# Patient Record
Sex: MALE | Race: BLACK OR AFRICAN AMERICAN | NOT HISPANIC OR LATINO | Employment: UNEMPLOYED | URBAN - METROPOLITAN AREA
[De-identification: names, ages, dates, MRNs, and addresses within clinical notes are randomized per-mention and may not be internally consistent; named-entity substitution may affect disease eponyms.]

---

## 2023-04-25 ENCOUNTER — HOSPITAL ENCOUNTER (INPATIENT)
Facility: HOSPITAL | Age: 48
LOS: 1 days | Discharge: HOME | End: 2023-04-27
Attending: EMERGENCY MEDICINE | Admitting: INTERNAL MEDICINE

## 2023-04-25 ENCOUNTER — APPOINTMENT (OUTPATIENT)
Dept: MRI IMAGING | Facility: HOSPITAL | Age: 48
End: 2023-04-25

## 2023-04-25 ENCOUNTER — APPOINTMENT (EMERGENCY)
Dept: CT IMAGING | Facility: HOSPITAL | Age: 48
End: 2023-04-25

## 2023-04-25 DIAGNOSIS — F41.9 ANXIETY: ICD-10-CM

## 2023-04-25 DIAGNOSIS — R20.0 RIGHT FACIAL NUMBNESS: ICD-10-CM

## 2023-04-25 DIAGNOSIS — R29.90 STROKE-LIKE SYMPTOMS: Primary | ICD-10-CM

## 2023-04-25 DIAGNOSIS — I10 HTN (HYPERTENSION) WITH GOAL TO BE DETERMINED: ICD-10-CM

## 2023-04-25 DIAGNOSIS — Z72.0 TOBACCO ABUSE: ICD-10-CM

## 2023-04-25 DIAGNOSIS — R93.0 ABNORMAL CT SCAN OF HEAD: ICD-10-CM

## 2023-04-25 DIAGNOSIS — R29.90 STROKE-LIKE SYMPTOMS: ICD-10-CM

## 2023-04-25 LAB
2HR DELTA HS TROPONIN: 10 NG/L
4HR DELTA HS TROPONIN: 5 NG/L
ANION GAP SERPL CALCULATED.3IONS-SCNC: 6 MMOL/L (ref 4–13)
APTT PPP: 27 SECONDS (ref 23–37)
BUN SERPL-MCNC: 15 MG/DL (ref 5–25)
CALCIUM SERPL-MCNC: 9.8 MG/DL (ref 8.4–10.2)
CARDIAC TROPONIN I PNL SERPL HS: 76 NG/L
CARDIAC TROPONIN I PNL SERPL HS: 81 NG/L
CARDIAC TROPONIN I PNL SERPL HS: 86 NG/L
CHLORIDE SERPL-SCNC: 101 MMOL/L (ref 96–108)
CO2 SERPL-SCNC: 26 MMOL/L (ref 21–32)
CREAT SERPL-MCNC: 1.01 MG/DL (ref 0.6–1.3)
ERYTHROCYTE [DISTWIDTH] IN BLOOD BY AUTOMATED COUNT: 13.1 % (ref 11.6–15.1)
FLUAV RNA RESP QL NAA+PROBE: NEGATIVE
FLUBV RNA RESP QL NAA+PROBE: NEGATIVE
GFR SERPL CREATININE-BSD FRML MDRD: 87 ML/MIN/1.73SQ M
GLUCOSE SERPL-MCNC: 142 MG/DL (ref 65–140)
HCT VFR BLD AUTO: 49.6 % (ref 36.5–49.3)
HGB BLD-MCNC: 17.2 G/DL (ref 12–17)
INR PPP: 1.08 (ref 0.84–1.19)
MCH RBC QN AUTO: 30.7 PG (ref 26.8–34.3)
MCHC RBC AUTO-ENTMCNC: 34.7 G/DL (ref 31.4–37.4)
MCV RBC AUTO: 88 FL (ref 82–98)
PLATELET # BLD AUTO: 276 THOUSANDS/UL (ref 149–390)
PMV BLD AUTO: 9.6 FL (ref 8.9–12.7)
POTASSIUM SERPL-SCNC: 3.8 MMOL/L (ref 3.5–5.3)
PROTHROMBIN TIME: 13.8 SECONDS (ref 11.6–14.5)
RBC # BLD AUTO: 5.61 MILLION/UL (ref 3.88–5.62)
RSV RNA RESP QL NAA+PROBE: NEGATIVE
SARS-COV-2 RNA RESP QL NAA+PROBE: NEGATIVE
SODIUM SERPL-SCNC: 133 MMOL/L (ref 135–147)
WBC # BLD AUTO: 12.27 THOUSAND/UL (ref 4.31–10.16)

## 2023-04-25 RX ORDER — ENOXAPARIN SODIUM 100 MG/ML
40 INJECTION SUBCUTANEOUS DAILY
Status: DISCONTINUED | OUTPATIENT
Start: 2023-04-26 | End: 2023-04-26

## 2023-04-25 RX ORDER — ASPIRIN 325 MG
325 TABLET ORAL ONCE
Status: COMPLETED | OUTPATIENT
Start: 2023-04-25 | End: 2023-04-25

## 2023-04-25 RX ORDER — LABETALOL HYDROCHLORIDE 5 MG/ML
10 INJECTION, SOLUTION INTRAVENOUS ONCE
Status: COMPLETED | OUTPATIENT
Start: 2023-04-25 | End: 2023-04-25

## 2023-04-25 RX ORDER — HYDROXYZINE HYDROCHLORIDE 25 MG/1
25 TABLET, FILM COATED ORAL EVERY 6 HOURS PRN
Status: DISCONTINUED | OUTPATIENT
Start: 2023-04-25 | End: 2023-04-27 | Stop reason: HOSPADM

## 2023-04-25 RX ORDER — ATORVASTATIN CALCIUM 40 MG/1
40 TABLET, FILM COATED ORAL EVERY EVENING
Status: DISCONTINUED | OUTPATIENT
Start: 2023-04-25 | End: 2023-04-27 | Stop reason: HOSPADM

## 2023-04-25 RX ORDER — CLOPIDOGREL BISULFATE 75 MG/1
300 TABLET ORAL ONCE
Status: COMPLETED | OUTPATIENT
Start: 2023-04-25 | End: 2023-04-25

## 2023-04-25 RX ORDER — CLOPIDOGREL BISULFATE 75 MG/1
75 TABLET ORAL DAILY
Status: DISCONTINUED | OUTPATIENT
Start: 2023-04-26 | End: 2023-04-26

## 2023-04-25 RX ADMIN — IOHEXOL 100 ML: 350 INJECTION, SOLUTION INTRAVENOUS at 10:24

## 2023-04-25 RX ADMIN — CLOPIDOGREL BISULFATE 300 MG: 75 TABLET ORAL at 11:13

## 2023-04-25 RX ADMIN — GADOBUTROL 9 ML: 604.72 INJECTION INTRAVENOUS at 22:47

## 2023-04-25 RX ADMIN — ATORVASTATIN CALCIUM 40 MG: 40 TABLET, FILM COATED ORAL at 17:35

## 2023-04-25 RX ADMIN — LABETALOL HYDROCHLORIDE 10 MG: 5 INJECTION, SOLUTION INTRAVENOUS at 11:13

## 2023-04-25 RX ADMIN — ASPIRIN 325 MG ORAL TABLET 325 MG: 325 PILL ORAL at 11:13

## 2023-04-25 NOTE — CONSULTS
Consultation - Stroke   Brock Garcia 49 y o  male MRN: [id number]  Unit/Bed#: ED 17 Encounter: [id number]      Assessment/Plan   Stroke-like symptoms  Assessment & Plan  Shilpa Aponte is a 49 y o  male with tobacco abuse who presents to [facility] ED on 4/25/2023 as a stroke alert with right upper and lower lip numbness and right thumb numbness  - BP elevated on presentation: 217/108  - NIHSS of 1  - CT head:  · Unremarkable for acute hemorrhage  · Scattered age-indeterminate hypodensities in bilateral parietal lobes   - CTA head and neck:   · Unremarkable for large vessel occlusion or critical stenosis  Thrombolytic Decision: Patient not a candidate  Unclear time of onset outside appropriate time window  Given hypertension on presentation with other vascular risk factors including tobacco abuse, will admit on stroke pathway and obtain MRI brain  Plan:  - Recommend MRI brain w wo  - Recommend echo  - Recommend: Fasting lipid panel, hemoglobin A1c, TSH  - S/p  mg x 1 and Plavix 300 mg x 1  - Plan to start DAPT ASA 81 mg daily and Plavix 75 mg daily on 4/26/2023  - Okay to start atorvastatin 40 mg daily  - Allow for permissive hypertension for 24 hours post stroke symptom onset (up until approximately 8 PM on 4/25/2023)  Goal /110  · If MRI brain is negative, recommend normotension   - Goal euglycemia, normothermia  - Frequent neurochecks  - PT/OT/speech  - Stroke education  - Medical management and supportive care per primary team, notify with changes    Tobacco abuse  Assessment & Plan  - Reportedly smokes 1 pack/day for the last 4-5 months  - Smoking cessation education  - Management per primary team    Recommendations for outpatient neurological follow up have yet to be determined      History of Present Illness     Reason for Consult / Principal Problem: Stroke alert, right lip and thumb numbness  Hx and PE limited by: N/A  Patient last known well: 8 PM on 4/24/2023  Stroke alert called: 1012 on 4/25/2023  Neurology time of arrival: Attending neurologist responded immediately to the phone call  HPI: Khushi Clinton is a 49 y o   male with tobacco abuse who presents to [facility] ED on 4/25/2023 as a stroke alert with right upper and lower lip numbness and right thumb numbness  Patient reports he was taking a bath last night 4/24/2023 at approximately 8 PM when he developed a severe right hemispheric pain which she describes as sharp and throbbing, which lasted 2-3 minutes and resolved on its own  After approximately 15 minutes, patient noticed numbness in his right upper lip and shortly after noticed numbness in his right thumb  At this time patient did not have any of the headache  Patient states he went to bed and when he woke up this morning noticed that the numbness was now present in his right upper and lower lip, and continued in his right thumb  Patient states he does not have any significant medical issues  He does admit to smoking 1 pack/day for the last 4-5 months  He reports significant stressors in his life stating that his mother is in hospice, who resides with his brother who is paralyzed from the neck down, and also reports the recent death of his son who was 21years old  Patient states he does not have a history of high blood pressure, high cholesterol, or diabetes  Patient does not take any blood thinners at baseline and states he does not take any medications on a daily basis  Patient also reports he does not typically get headaches  Patient presented to [facility] ED on 4/25/2023 as a stroke alert, BP was elevated at 217/108  Upon arrival to the ED, patient states that the numbness in his right thumb had resolved  CT head unremarkable for acute hemorrhage, however did demonstrate age-indeterminate hypodensities in bilateral parietal lobes  CTA head and neck unremarkable for large vessel occlusion or critical stenosis   Patient was not an IV TNK candidate as he was outside of the appropriate time window  Patient was loaded with aspirin and Plavix and admitted on the stroke pathway  Inpatient consult to Neurology  Consult performed by: Gillian Holbrook PA-C  Consult ordered by: Kimo Perkins MD        Review of Systems  12 point ROS limited to acuity of condition  Historical Information   History reviewed  No pertinent past medical history  History reviewed  No pertinent surgical history  Social History   Social History     Substance and Sexual Activity   Alcohol Use Not Currently     Social History     Substance and Sexual Activity   Drug Use Never     E-Cigarette/Vaping     E-Cigarette/Vaping Substances     Social History     Tobacco Use   Smoking Status Every Day   • Packs/day: 1 00   • Types: Cigarettes   Smokeless Tobacco Never     Family History: History reviewed  No pertinent family history  Review of previous medical records was completed  Meds/Allergies   all current active meds have been reviewed, current meds:   No current facility-administered medications for this encounter    , PTA meds:   None    and     No Known Allergies    Objective   Vitals:Blood pressure (!) 220/110, pulse 81, temperature 97 8 °F (36 6 °C), temperature source Oral, resp  rate 22, weight 102 kg (225 lb 15 5 oz), SpO2 98 %  ,There is no height or weight on file to calculate BMI  No intake or output data in the 24 hours ending 04/25/23 1157    Invasive Devices: Invasive Devices     Peripheral Intravenous Line  Duration           Peripheral IV 04/25/23 Left Antecubital <1 day              Physical Exam  Vitals and nursing note reviewed  Constitutional:       General: He is not in acute distress  Appearance: Normal appearance  He is not ill-appearing, toxic-appearing or diaphoretic  HENT:      Head: Normocephalic and atraumatic  No Campbell's sign  Eyes:      General: No scleral icterus  Right eye: No discharge  Left eye: Discharge present       Extraocular Movements: Extraocular movements intact and EOM normal       Conjunctiva/sclera: Conjunctivae normal       Pupils: Pupils are equal, round, and reactive to light  Comments: Left eye conjunctiva erythematous   Musculoskeletal:         General: Normal range of motion  Cervical back: Normal range of motion and neck supple  Skin:     General: Skin is warm and dry  Findings: No bruising  Neurological:      Mental Status: He is alert  Psychiatric:         Mood and Affect: Mood normal          Behavior: Behavior normal          Thought Content: Thought content normal          Judgment: Judgment normal        Neurologic Exam     Mental Status   Patient is alert, sitting up in bed  Oriented x3  No dysarthria or aphasia noted  Able to follow central commands and answers all questions appropriately  Cranial Nerves     CN II   Visual fields full to confrontation  CN III, IV, VI   Pupils are equal, round, and reactive to light  Extraocular motions are normal    Nystagmus: none   Upgaze: normal  Downgaze: normal  Conjugate gaze: present    CN VII   Facial expression full, symmetric  CN VIII   Hearing: intact    CN XII   Tongue deviation: none  Diminished sensation to light touch in right upper and lower lip     Motor Exam   Muscle bulk: normal  Overall muscle tone: normal  Bilateral  5/5  Bilateral biceps, triceps, deltoid strength 5/5  Bilateral hip flexion strength 5/5  Bilateral dorsiflexion and plantarflexion strength 5/5     Sensory Exam   Light touch normal    No evidence of extinction with bilateral simultaneous stimulation     Gait, Coordination, and Reflexes     Tremor   Resting tremor: absent  No ataxia or dysmetria in bilateral upper extremity finger-nose testing    No involuntary movements noted throughout exam     NIHSS:  1a Level of Consciousness: 0 = Alert   1b  LOC Questions: 0 = Answers both correctly   1c  LOC Commands: 0 = Obeys both correctly   2  Best Gaze: 0 = Normal   3   Visual: 0 = No visual field loss   4  Facial Palsy: 0=Normal symmetric movement   5a  Motor Right Arm: 0=No drift, limb holds 90 (or 45) degrees for full 10 seconds   5b  Motor Left Arm: 0=No drift, limb holds 90 (or 45) degrees for full 10 seconds   6a  Motor Right Le=No drift, limb holds 90 (or 45) degrees for full 10 seconds   6b  Motor Left Le=No drift, limb holds 90 (or 45) degrees for full 10 seconds   7  Limb Ataxia:  0=Absent   8  Sensory: 1=Mild to moderate sensory loss; patient feels pinprick is less sharp or is dull on the affected side; there is a loss of superficial pain with pinprick but patient is aware He is being touched   9  Best Language:  0=No aphasia, normal   10  Dysarthria: 0=Normal articulation   11  Extinction and Inattention (formerly Neglect): 0=No abnormality   Total Score: 1     Time NIHSS was completed: 1030    Modified [place] Score:  0 (No baseline symptoms/disability)    Lab Results: I have personally reviewed pertinent reports    Recent Results (from the past 24 hour(s))   ECG 12 lead    Collection Time: 23 10:08 AM   Result Value Ref Range    Ventricular Rate 90 BPM    Atrial Rate 90 BPM    CO Interval 184 ms    QRSD Interval 94 ms    QT Interval 378 ms    QTC Interval 462 ms    P [place] 59 degrees    QRS Axis -7 degrees    T Wave Axis 123 degrees   Basic metabolic panel    Collection Time: 23 10:12 AM   Result Value Ref Range    Sodium 133 (L) 135 - 147 mmol/L    Potassium 3 8 3 5 - 5 3 mmol/L    Chloride 101 96 - 108 mmol/L    CO2 26 21 - 32 mmol/L    ANION GAP 6 4 - 13 mmol/L    BUN 15 5 - 25 mg/dL    Creatinine 1 01 0 60 - 1 30 mg/dL    Glucose 142 (H) 65 - 140 mg/dL    Calcium 9 8 8 4 - 10 2 mg/dL    eGFR 87 ml/min/1 73sq m   CBC and Platelet    Collection Time: 23 10:12 AM   Result Value Ref Range    WBC 12 27 (H) 4 31 - 10 16 Thousand/uL    RBC 5 61 3 88 - 5 62 Million/uL    Hemoglobin 17 2 (H) 12 0 - 17 0 g/dL    Hematocrit 49 6 (H) 36 5 - 49 3 %    MCV 88 82 - 98 fL "   MCH 30 7 26 8 - 34 3 pg    MCHC 34 7 31 4 - 37 4 g/dL    RDW 13 1 11 6 - 15 1 %    Platelets 736 243 - 083 Thousands/uL    MPV 9 6 8 9 - 12 7 fL   Protime-INR    Collection Time: 04/25/23 10:12 AM   Result Value Ref Range    Protime 13 8 11 6 - 14 5 seconds    INR 1 08 0 84 - 1 19   APTT    Collection Time: 04/25/23 10:12 AM   Result Value Ref Range    PTT 27 23 - 37 seconds   HS Troponin 0hr (reflex protocol)    Collection Time: 04/25/23 10:12 AM   Result Value Ref Range    hs TnI 0hr 76 (H) \"Refer to ACS Flowchart\"- see link ng/L   FLU/RSV/COVID - if FLU/RSV clinically relevant    Collection Time: 04/25/23 10:12 AM    Specimen: Nose; Nares   Result Value Ref Range    SARS-CoV-2 Negative Negative    INFLUENZA A PCR Negative Negative    INFLUENZA B PCR Negative Negative    RSV PCR Negative Negative   ]  Imaging Studies: I have personally reviewed pertinent reports and I have personally reviewed pertinent films in PACS  EKG, Pathology, and Other Studies: I have personally reviewed pertinent reports  VTE Prophylaxis: Patient in the ED, will be placed on DVT prophylaxis once admitted to the floor    Dictation voice to text software has been used in the creation of this document  Please consider this in light of any contextual or grammatical errors     "

## 2023-04-25 NOTE — ASSESSMENT & PLAN NOTE
2023: New to this neurology examiner is this right-hand-dominant 49 y o  male with no previous neurologic history, however he is a recurrent tobacco abuse gentleman who presents to [facility] ED on 2023 as a stroke alert with right upper and lower lip numbness and right thumb numbness  Symptoms resolved shortly after presentation he has not had recurrences  - BP elevated on presentation: 217/108  - NIHSS of 1, he was not a thrombolytic candidate   - CT head:  · Unremarkable for acute hemorrhage  · Scattered age-indeterminate hypodensities in bilateral parietal lobes   - CTA head and neck:   · Unremarkable for large vessel occlusion or critical stenosis  - MRI brain: There was a subcentimeter foci of mild restricted diffusion within the left thalamus and centrum semiovale which can be seen with ischemic changes or demyelination  He was noted to have a subcentimeter lesion within the posterior right centrum semiovale demonstrating central cystic change and peripheral rim of hyperintense T2/FLAIR signal with mild diffusion restriction which raises concern for demyelinating disease  A subcentimeter lesion is noted within the central right cerebellum as well  There is no pathologic area of intra-axial enhancement to suggest acute demyelination  Also low-lying cerebellar tonsils without meeting criteria for Chiari I malformation  Past echocardiogram has been done but the read is pending  His labs are pending, specifically his A1c his lipid panel demonstrates a markedly elevated LDL  On today's exam this patient has no focality  He was able to tolerate high-level maneuvers without any focality as well  This patient's family history includes that of an aunt as well as a sister both of whom are  from lupus  His father was also a type I diabetic  He denies any other history of autoimmune disease to his knowledge  Plan:  -Patient will likely benefit from a spinal tap    However he had 300 mg of Plavix yesterday and his regular dose this morning in addition to his aspirin  He likely will tap him possibly tomorrow   - Recommend: Fasting lipid panel, hemoglobin A1c, TSH  -We will hold his Plavix after today for the possibility of a tap later this week    - Okay to start atorvastatin 40 mg daily  - We can begin to normalize his blood pressure  - Goal euglycemia, normothermia  - Frequent neurochecks  - PT/OT/speech  - Stroke education  - Medical management and supportive care per primary team, notify with changes

## 2023-04-25 NOTE — ASSESSMENT & PLAN NOTE
Patient reported while getting into the bathtub yesterday he experienced posterior head pain  He did not describe it as a headache but  throbbing on the outside of his skull  Shortly after the headache he was experiencing tingling on his right lip as well as radiating down his right arm  His thumb had significant paresthesia  Paresthesia in his thumb has since resolved      Start loading with ASA and Plavix  Obtain lipid panel and hemoglobin A1c  Obtain MRI of brain  CT of brain showed age indeterminate hypodensities  CT of head and neck showed no stenosis/hemorrhage  Neurology consulted  Placed on telemetry  Start stroke pathway  Obtain echo  Allow for permissive hypertension goal of 220/110

## 2023-04-25 NOTE — ASSESSMENT & PLAN NOTE
Patient recently started smoking approximately 4 months ago due to recent stresses  He reports smoking up to a pack and a half a day      Smoking cessation education  We will provide nicotine patch

## 2023-04-25 NOTE — ASSESSMENT & PLAN NOTE
- Reportedly smokes 1 pack/day for the last 4-5 months  This patient quit smoking on a routine basis approximately 10 to 15 years ago  - Smoking cessation education was done and discussed again with this patient  He reports that there are significant stressors in his life right now causing him to smoke

## 2023-04-25 NOTE — ED PROVIDER NOTES
History  Chief Complaint   Patient presents with   • Facial Numbness     Pt states he has numbness in the right side of his lips  Pt states he also had a headache last night that has subsided  Per EMS the pt's BP was elevated  No medical hx      HPI patient is a 79-year-old male, reports he got in the tub last night and noticed some right posterior headache somewhat of a throbbing sensation  He reports 15 minutes later he developed numbness of his right upper lip and then followed by numbness of his right lower lip  Patient reports the right upper lip and the right lower lip have remained numb and he has decreased sensation there  Patient reports this morning he awoke and tried to use his phone and apparently had difficulty using his right thumb he reports numbness of the right thumb  He reports he did have motor strength but he was unable to feel with his thumb  He reports the thumb symptoms have since resolved  The patient apparently googled stroke symptoms and  realize he could be having a stroke so he called EMS  Patient presents via EMS with right facial numbness currently       Past medical history previously healthy  Family is noncontributory  Social history, smoker, denies drug abuse    None       History reviewed  No pertinent past medical history  History reviewed  No pertinent surgical history  History reviewed  No pertinent family history  I have reviewed and agree with the history as documented  E-Cigarette/Vaping     E-Cigarette/Vaping Substances     Social History     Tobacco Use   • Smoking status: Every Day     Packs/day: 1 00     Types: Cigarettes   • Smokeless tobacco: Never   Substance Use Topics   • Alcohol use: Not Currently   • Drug use: Never       Review of Systems   Constitutional: Negative for diaphoresis, fatigue and fever  HENT: Negative for congestion, ear pain, nosebleeds and sore throat  Eyes: Negative for photophobia, pain, discharge and visual disturbance  Respiratory: Negative for cough, choking, chest tightness, shortness of breath and wheezing  Cardiovascular: Negative for chest pain and palpitations  Gastrointestinal: Negative for abdominal distention, abdominal pain, diarrhea and vomiting  Genitourinary: Negative for dysuria, flank pain and frequency  Musculoskeletal: Negative for back pain, gait problem and joint swelling  Skin: Negative for color change and rash  Neurological: Positive for numbness  Negative for dizziness, syncope and headaches  Psychiatric/Behavioral: Negative for behavioral problems and confusion  The patient is not nervous/anxious  All other systems reviewed and are negative  Reports numbness of the right side of his upper and lower lip, reports numbness of his right thumb that now is resolved  Physical Exam  Physical Exam  Vitals and nursing note reviewed  Constitutional:       Appearance: He is well-developed  HENT:      Head: Normocephalic  Right Ear: External ear normal       Left Ear: External ear normal       Nose: Nose normal    Eyes:      General: Lids are normal       Pupils: Pupils are equal, round, and reactive to light  Cardiovascular:      Rate and Rhythm: Normal rate and regular rhythm  Pulses: Normal pulses  Heart sounds: Normal heart sounds  Pulmonary:      Effort: Pulmonary effort is normal  No respiratory distress  Abdominal:      General: Abdomen is flat  Bowel sounds are normal    Musculoskeletal:         General: No deformity  Normal range of motion  Cervical back: Normal range of motion and neck supple  Skin:     General: Skin is warm and dry  Neurological:      Mental Status: He is alert and oriented to person, place, and time  Motor: No weakness  Gait: Gait normal       Comments: Reports numbness over the right upper and lower lip, no intraoral numbness, no facial weakness    There is normal sensation over his cheeks         Vital Signs  ED Triage Vitals   Temperature Pulse Respirations Blood Pressure SpO2   04/25/23 1030 04/25/23 1006 04/25/23 1006 04/25/23 1006 04/25/23 1006   97 8 °F (36 6 °C) 92 18 (!) 217/108 98 %      Temp Source Heart Rate Source Patient Position - Orthostatic VS BP Location FiO2 (%)   04/25/23 1030 04/25/23 1006 04/25/23 1100 04/25/23 1006 --   Oral Monitor Lying Right arm       Pain Score       --                  Vitals:    04/25/23 1100 04/25/23 1200 04/25/23 1300 04/25/23 1400   BP: (!) 220/110 (!) 204/109 (!) 182/91 (!) 193/99   Pulse: 81 73 66 72   Patient Position - Orthostatic VS: Lying Lying Lying Lying         Visual Acuity  Visual Acuity    Flowsheet Row Most Recent Value   L Pupil Size (mm) 3   R Pupil Size (mm) 3          ED Medications  Medications   iohexol (OMNIPAQUE) 350 MG/ML injection (SINGLE-DOSE) 100 mL (100 mL Intravenous Given 4/25/23 1024)   aspirin tablet 325 mg (325 mg Oral Given 4/25/23 1113)   clopidogrel (PLAVIX) tablet 300 mg (300 mg Oral Given 4/25/23 1113)   labetalol (NORMODYNE) injection 10 mg (10 mg Intravenous Given 4/25/23 1113)       Diagnostic Studies  Results Reviewed     Procedure Component Value Units Date/Time    HS Troponin I 4hr [[id number]]  (Abnormal) Collected: 04/25/23 1416    Lab Status: Final result Specimen: Blood from Arm, Left Updated: 04/25/23 1457     hs TnI 4hr 81 ng/L      Delta 4hr hsTnI 5 ng/L     HS Troponin I 2hr [[id number]]  (Abnormal) Collected: 04/25/23 1226    Lab Status: Final result Specimen: Blood from Arm, Left Updated: 04/25/23 1258     hs TnI 2hr 86 ng/L      Delta 2hr hsTnI 10 ng/L     FLU/RSV/COVID - if FLU/RSV clinically relevant [[id number]]  (Normal) Collected: 04/25/23 1012    Lab Status: Final result Specimen: Nares from Nose Updated: 04/25/23 1100     SARS-CoV-2 Negative     INFLUENZA A PCR Negative     INFLUENZA B PCR Negative     RSV PCR Negative    Narrative:      FOR PEDIATRIC PATIENTS - copy/paste COVID Guidelines URL to browser: [email or web address]    SARS-CoV-2 assay is a Nucleic Acid Amplification assay intended for the  qualitative detection of nucleic acid from SARS-CoV-2 in nasopharyngeal  swabs  Results are for the presumptive identification of SARS-CoV-2 RNA  Positive results are indicative of infection with SARS-CoV-2, the virus  causing COVID-19, but do not rule out bacterial infection or co-infection  with other viruses  Laboratories within the [place] and its  territories are required to report all positive results to the appropriate  public health authorities  Negative results do not preclude SARS-CoV-2  infection and should not be used as the sole basis for treatment or other  patient management decisions  Negative results must be combined with  clinical observations, patient history, and epidemiological information  This test has not been FDA cleared or approved  This test has been authorized by FDA under an Emergency Use Authorization  (EUA)  This test is only authorized for the duration of time the  declaration that circumstances exist justifying the authorization of the  emergency use of an in vitro diagnostic tests for detection of SARS-CoV-2  virus and/or diagnosis of COVID-19 infection under section 564(b)(1) of  the Act, 21 U  S C  511IGE-5(I)(3), unless the authorization is terminated  or revoked sooner  The test has been validated but independent review by FDA  and CLIA is pending  Test performed using [employer] Gene[employer]pert: This RT-PCR assay targets N2,  a region unique to SARS-CoV-2  A conserved region in the E-gene was chosen  for pan-Sarbecovirus detection which includes SARS-CoV-2  According to CMS-2020-01-R, this platform meets the definition of high-throughput technology      HS Troponin 0hr (reflex protocol) [[id number]]  (Abnormal) Collected: 04/25/23 1012    Lab Status: Final result Specimen: Blood from Arm, Left Updated: 04/25/23 1048     hs TnI 0hr 76 ng/L     Basic metabolic panel [[id number]]  (Abnormal) Collected: 04/25/23 1012    Lab Status: Final result Specimen: Blood from Arm, Left Updated: 04/25/23 1039     Sodium 133 mmol/L      Potassium 3 8 mmol/L      Chloride 101 mmol/L      CO2 26 mmol/L      ANION GAP 6 mmol/L      BUN 15 mg/dL      Creatinine 1 01 mg/dL      Glucose 142 mg/dL      Calcium 9 8 mg/dL      eGFR 87 ml/min/1 73sq m     Narrative:      [place] guidelines for Chronic Kidney Disease (CKD):   •  Stage 1 with normal or high GFR (GFR > 90 mL/min/1 73 square meters)  •  Stage 2 Mild CKD (GFR = 60-89 mL/min/1 73 square meters)  •  Stage 3A Moderate CKD (GFR = 45-59 mL/min/1 73 square meters)  •  Stage 3B Moderate CKD (GFR = 30-44 mL/min/1 73 square meters)  •  Stage 4 Severe CKD (GFR = 15-29 mL/min/1 73 square meters)  •  Stage 5 End Stage CKD (GFR <15 mL/min/1 73 square meters)  Note: GFR calculation is accurate only with a steady state creatinine    Protime-INR [[id number]]  (Normal) Collected: 04/25/23 1012    Lab Status: Final result Specimen: Blood from Arm, Left Updated: 04/25/23 1034     Protime 13 8 seconds      INR 1 08    APTT [[id number]]  (Normal) Collected: 04/25/23 1012    Lab Status: Final result Specimen: Blood from Arm, Left Updated: 04/25/23 1034     PTT 27 seconds     CBC and Platelet [[id number]]  (Abnormal) Collected: 04/25/23 1012    Lab Status: Final result Specimen: Blood from Arm, Left Updated: 04/25/23 1022     WBC 12 27 Thousand/uL      RBC 5 61 Million/uL      Hemoglobin 17 2 g/dL      Hematocrit 49 6 %      MCV 88 fL      MCH 30 7 pg      MCHC 34 7 g/dL      RDW 13 1 %      Platelets 259 Thousands/uL      MPV 9 6 fL                  CTA stroke alert (head/neck)   Final Result by Ayesha Muir MD (04/25 1049)      1  No hemodynamically significant stenosis in the major arteries of the neck  2   No intracranial aneurysm or major intracranial arterial stenosis     3   No acute intracranial hemorrhage  I personally provided preliminary results of this study with Christiano Herron and Garcia Temitope on 4/25/2023 at 10:42 AM                                      Workstation performed: [id number]         CT stroke alert brain   Final Result by Jonny Yoo MD (04/25 1037)         1  No acute intracranial hemorrhage  2   A few scattered age-indeterminate hypodensities in the parietal lobes bilaterally  Age-indeterminate infarction is in the differential diagnosis  Precocious changes of microangiopathy could be considered if there are cerebrovascular risk factors  Other postinfectious, postinflammatory or demyelinating processes including Lyme disease or multiple sclerosis are in the differential diagnosis  Contrast enhanced MRI of the brain may be of additional use for further characterization              Workstation performed: [id number]                    Procedures  ECG 12 Lead Documentation Only    Date/Time: 4/25/2023 10:13 AM  Performed by: Aleida Curran MD  Authorized by: Aleida Curran MD     Indications / Diagnosis:  Stroke symptoms numbness  ECG reviewed by me, the ED Provider: yes    Patient location:  ED  Previous ECG:     Previous ECG:  Unavailable    Comparison to cardiac monitor: Yes    Interpretation:     Interpretation: non-specific    Rate:     ECG rate:  90    ECG rate assessment: normal    Rhythm:     Rhythm: sinus rhythm    Comments:      Normal sinus rhythm, lateral ST-T wave depressions no ST elevations, no STEMI,    CriticalCare Time    Date/Time: 4/25/2023 3:00 PM  Performed by: Aleida Curran MD  Authorized by: Aleida Curran MD     Critical care provider statement:     Critical care time (minutes):  30    Critical care start time:  4/25/2023 10:14 AM    Critical care end time:  4/25/2023 10:45 AM    Critical care time was exclusive of:  Separately billable procedures and treating other patients and teaching time    Critical care was necessary to treat or prevent imminent or life-threatening deterioration of the following conditions:  CNS failure or compromise    Critical care was time spent personally by me on the following activities:  Obtaining history from patient or surrogate, discussions with consultants, examination of patient, ordering and review of laboratory studies, ordering and review of radiographic studies and re-evaluation of patient's condition             ED Course     Diagnostic testing: COVID testing influenza testing and RSV testing are negative, initial cardiac troponin was 76, repeat cardiac troponin was 86, abnormal with a delta of 10, EKG showed nonspecific ST-T wave changes no ST elevations  Nonspecific finding require further evaluation  Serum electrolytes were within normal limits with a blood sugar 142  White count was elevated at 12 2 nonspecific finding  Hemoglobin was elevated 17 consistent with hemoconcentration or possibly related The patient smoking  CT scan of the brain showed no acute bleeding, there was scattered areas of hypodensity possibly atrocious microangiopathic changes, will require MRI  Stroke Assessment     Row Name 04/25/23 1012             NIH Stroke Scale    Interval --      Level of Consciousness (1a ) 0      LOC Questions (1b ) 0      LOC Commands (1c ) 0      Best Gaze (2 ) 0      Visual (3 ) 0      Facial Palsy (4 ) 0      Motor Arm, Left (5a ) 0      Motor Arm, Right (5b ) 0      Motor Leg, Left (6a ) 0      Motor Leg, Right (6b ) 0      Limb Ataxia (7 ) 0      Sensory (8 ) 1      Best Language (9 ) 0      Dysarthria (10 ) 0      Extinction and Inattention (11 ) (Formerly Neglect) 0      Total 1              Flowsheet Row Most Recent Value   Thrombolytic Decision Options    Thrombolytic Decision Patient not a candidate  Patient is not a candidate options Symptoms resolved/clearly non disabling                                    Medical Decision Making  Medical decision making 79-year-old male presents emergency department with right upper and lower lip numbness, right thumb numbness that resolved prior to arrival, patient was made a stroke alert patient was seen in consultation with neurology  Patient had nonspecific findings on his CT scan will require MRI  Neurology recommended aspirin Plavix and stroke pathway  Also had an elevated troponin  Patient was seen by the hospitalist and will be admitted to their service  Discussed t with the patient, advised to treat his blood pressure treated with labetalol with initial blood pressure was elevated, currently diastolic blood pressure around 100  Low NIH score no indication for TNKase  Discussed with neurology, discussed with hospitalist service  Critical care 30 minutes  Amount and/or Complexity of Data Reviewed  Labs: ordered  Radiology: ordered  Risk  OTC drugs  Prescription drug management  Decision regarding hospitalization  Disposition  Final diagnoses:   Right facial numbness   Abnormal CT scan of head     Time reflects when diagnosis was documented in both MDM as applicable and the Disposition within this note     Time User Action Codes Description Comment    4/25/2023 10:11 AM Graham Odonnell Add [R20 0] Right facial numbness     4/25/2023 10:50 AM Graham Odonnell Add [R93 0] Abnormal CT scan of head     4/25/2023 11:51 AM Susy Ocampo Add [R29 90] Stroke-like symptoms       ED Disposition     ED Disposition   Admit    Condition   Stable    Date/Time   Tue Apr 25, 2023 10:50 AM    Comment   Case was discussed with the hospitalist service and the patient's admission status was agreed to be The patient status to the service of Dr Harjit Daniels    None         Patient's Medications    No medications on file       No discharge procedures on file      PDMP Review     None          ED Provider  Electronically Signed by           Akhil Berry MD  04/25/23 230 [facility] MD  04/25/23 150

## 2023-04-25 NOTE — H&P
[address] [place] [address]  H&P  Name: Godfrey Grant 49 y o  male I MRN: [id number]  Unit/Bed#: ED 16 I Date of Admission: 4/25/2023   Date of Service: 4/25/2023 I Hospital Day: 0      Assessment/Plan   HTN (hypertension) with goal to be determined  Assessment & Plan  Permissive hypertension currently with a goal of 220/110 until 8 PM tonight    Anxiety  Assessment & Plan  Patient has numerous major stressful events occurring in his life simultaneously in the past few months  We will give Atarax as needed for anxiety    Tobacco abuse  Assessment & Plan  Patient recently started smoking approximately 4 months ago due to recent stresses  He reports smoking up to a pack and a half a day  Smoking cessation education  We will provide nicotine patch    Stroke-like symptoms  Assessment & Plan  Patient reported while getting into the bathtub yesterday he experienced posterior head pain  He did not describe it as a headache but  throbbing on the outside of his skull  Shortly after the headache he was experiencing tingling on his right lip as well as radiating down his right arm  His thumb had significant paresthesia  Paresthesia in his thumb has since resolved  Start loading with ASA and Plavix  Obtain lipid panel and hemoglobin A1c  Obtain MRI of brain  CT of brain showed age indeterminate hypodensities  CT of head and neck showed no stenosis/hemorrhage  Neurology consulted  Placed on telemetry  Start stroke pathway  Obtain echo  Allow for permissive hypertension goal of 220/110         VTE Pharmacologic Prophylaxis: VTE Score: 5 High Risk (Score >/= 5) - Pharmacological DVT Prophylaxis Ordered: enoxaparin (Lovenox)  Sequential Compression Devices Ordered  Code Status: Full Code  Discussion with family: Patient declined call to        Anticipated Length of Stay: Patient will be admitted on an observation basis with an anticipated length of stay of less than 2 midnights secondary to CVA R/O     Total Time Spent on Date of Encounter in care of patient: 65 minutes This time was spent on one or more of the following: performing physical exam; counseling and coordination of care; obtaining or reviewing history; documenting in the medical record; reviewing/ordering tests, medications or procedures; communicating with other healthcare professionals and discussing with patient's family/caregivers  Chief Complaint: Numbness and tingling on right side lips    History of Present Illness:  Shubham Lopes is a 49 y o  male who is a recent smoker who presents with numbness and tingling on his right side of lips  The patient reported while getting into the bathtub yesterday that he experienced posterior head pain  Shortly after having the head pain, which was adamant he did not feel like a normal headache, he developed numbness and tingling in his lip as well as radiating down his arm specifically on his thumb  Over the past 12 hours the thumb numbness/tingling resolved, however the patient still remains to have tingling on his lips  He denies any associated weakness, recent illness, recent travel, or history of cardiopulmonary disease or blood clotting  Review of Systems:  Review of Systems   Constitutional: Negative for activity change, chills, fatigue and fever  HENT: Negative for congestion, rhinorrhea, sinus pain and sore throat  Eyes: Negative for photophobia and discharge  Respiratory: Negative for cough, chest tightness and wheezing  Cardiovascular: Negative for chest pain, palpitations and leg swelling  Gastrointestinal: Negative for constipation, diarrhea, nausea and vomiting  Genitourinary: Negative for dysuria  Musculoskeletal: Negative for arthralgias, back pain, myalgias and neck stiffness  Skin: Positive for rash  Neurological: Positive for numbness and headaches  Negative for dizziness, tremors, weakness and light-headedness     Psychiatric/Behavioral: Negative for confusion and hallucinations  The patient is nervous/anxious  Past Medical and Surgical History:   History reviewed  No pertinent past medical history  History reviewed  No pertinent surgical history  Meds/Allergies:  Prior to Admission medications    Not on File     I have reviewed home medications with patient personally  Allergies: No Known Allergies    Social History:  Marital Status: Single   Occupation: N/A  Patient Pre-hospital Living Situation: Home  Patient Pre-hospital Level of Mobility: walks  Patient Pre-hospital Diet Restrictions: none  Substance Use History:   Social History     Substance and Sexual Activity   Alcohol Use Not Currently     Social History     Tobacco Use   Smoking Status Every Day   • Packs/day: 1 00   • Types: Cigarettes   Smokeless Tobacco Never     Social History     Substance and Sexual Activity   Drug Use Never       Family History:  History reviewed  No pertinent family history  Physical Exam:     Vitals:   Blood Pressure: (!) 220/110 (04/25/23 1100)  Pulse: 81 (04/25/23 1100)  Temperature: 97 8 °F (36 6 °C) (04/25/23 1030)  Temp Source: Oral (04/25/23 1030)  Respirations: 22 (04/25/23 1100)  Weight - Scale: 102 kg (225 lb 15 5 oz) (04/25/23 1009)  SpO2: 98 % (04/25/23 1100)    Physical Exam  Vitals and nursing note reviewed  Constitutional:       Appearance: He is normal weight  HENT:      Head: Normocephalic  Nose: Nose normal       Mouth/Throat:      Mouth: Mucous membranes are moist       Pharynx: Oropharynx is clear  Eyes:      General: No scleral icterus  Conjunctiva/sclera: Conjunctivae normal       Pupils: Pupils are equal, round, and reactive to light  Cardiovascular:      Rate and Rhythm: Normal rate and regular rhythm  Heart sounds: No murmur heard  No friction rub  No gallop  Pulmonary:      Effort: Pulmonary effort is normal  No respiratory distress  Breath sounds: Normal breath sounds  No stridor   No wheezing, rhonchi or rales  Abdominal:      General: Abdomen is flat  Palpations: Abdomen is soft  Musculoskeletal:         General: Normal range of motion  Cervical back: Normal range of motion and neck supple  Right lower leg: No edema  Left lower leg: No edema  Comments: Patient reports having sensation intact in upper and lower extremities  Patient has full range of motion in upper and lower extremities as well as bilaterally symmetric strength  No facial droop, no deviated tongue protrusion   Lymphadenopathy:      Cervical: No cervical adenopathy  Skin:     General: Skin is warm  Coloration: Skin is not jaundiced or pale  Findings: No bruising, erythema or lesion  Neurological:      General: No focal deficit present  Mental Status: He is alert and oriented to person, place, and time  Mental status is at baseline  Cranial Nerves: No cranial nerve deficit  Motor: No weakness  Psychiatric:         Mood and Affect: Mood normal          Behavior: Behavior normal          Thought Content: Thought content normal           Additional Data:     Lab Results:  Results from last 7 days   Lab Units 04/25/23  1012   WBC Thousand/uL 12 27*   HEMOGLOBIN g/dL 17 2*   HEMATOCRIT % 49 6*   PLATELETS Thousands/uL 276     Results from last 7 days   Lab Units 04/25/23  1012   SODIUM mmol/L 133*   POTASSIUM mmol/L 3 8   CHLORIDE mmol/L 101   CO2 mmol/L 26   BUN mg/dL 15   CREATININE mg/dL 1 01   ANION GAP mmol/L 6   CALCIUM mg/dL 9 8   GLUCOSE RANDOM mg/dL 142*     Results from last 7 days   Lab Units 04/25/23  1012   INR  1 08                   Lines/Drains:  Invasive Devices     Peripheral Intravenous Line  Duration           Peripheral IV 04/25/23 Left Antecubital <1 day                    Imaging: Reviewed radiology reports from this admission including: CT head  CTA stroke alert (head/neck)   Final Result by Clifton Robison MD (04/25 3249)      1    No hemodynamically significant stenosis in the major arteries of the neck  2   No intracranial aneurysm or major intracranial arterial stenosis  3   No acute intracranial hemorrhage  I personally provided preliminary results of this study with Cheryle Hench and Lisa Mckeon on 4/25/2023 at 10:42 AM                                      Workstation performed: [id number]         CT stroke alert brain   Final Result by Priscille Carrel, MD (04/25 1037)         1  No acute intracranial hemorrhage  2   A few scattered age-indeterminate hypodensities in the parietal lobes bilaterally  Age-indeterminate infarction is in the differential diagnosis  Precocious changes of microangiopathy could be considered if there are cerebrovascular risk factors  Other postinfectious, postinflammatory or demyelinating processes including Lyme disease or multiple sclerosis are in the differential diagnosis  Contrast enhanced MRI of the brain may be of additional use for further characterization  Workstation performed: [id number]             EKG and Other Studies Reviewed on Admission:   · EKG: NSR  HR 90     ** Please Note: This note has been constructed using a voice recognition system   **

## 2023-04-26 ENCOUNTER — APPOINTMENT (OUTPATIENT)
Dept: NON INVASIVE DIAGNOSTICS | Facility: HOSPITAL | Age: 48
End: 2023-04-26

## 2023-04-26 LAB
ANION GAP SERPL CALCULATED.3IONS-SCNC: 5 MMOL/L (ref 4–13)
AORTIC ROOT: 2.8 CM
APICAL FOUR CHAMBER EJECTION FRACTION: 56 %
ASCENDING AORTA: 3.2 CM
ATRIAL RATE: 90 BPM
BUN SERPL-MCNC: 16 MG/DL (ref 5–25)
CALCIUM SERPL-MCNC: 9.5 MG/DL (ref 8.4–10.2)
CHLORIDE SERPL-SCNC: 103 MMOL/L (ref 96–108)
CHOLEST SERPL-MCNC: 230 MG/DL
CO2 SERPL-SCNC: 26 MMOL/L (ref 21–32)
CREAT SERPL-MCNC: 0.94 MG/DL (ref 0.6–1.3)
CRP SERPL QL: 6.8 MG/L
DOP CALC LVOT AREA: 4.52 CM2
DOP CALC LVOT DIAMETER: 2.4 CM
E WAVE DECELERATION TIME: 254 MS
ERYTHROCYTE [DISTWIDTH] IN BLOOD BY AUTOMATED COUNT: 13.1 % (ref 11.6–15.1)
FRACTIONAL SHORTENING: 35 (ref 28–44)
GFR SERPL CREATININE-BSD FRML MDRD: 95 ML/MIN/1.73SQ M
GLUCOSE P FAST SERPL-MCNC: 121 MG/DL (ref 65–99)
GLUCOSE SERPL-MCNC: 121 MG/DL (ref 65–140)
HCT VFR BLD AUTO: 48.6 % (ref 36.5–49.3)
HDLC SERPL-MCNC: 32 MG/DL
HGB BLD-MCNC: 16.7 G/DL (ref 12–17)
INTERVENTRICULAR SEPTUM IN DIASTOLE (PARASTERNAL SHORT AXIS VIEW): 1.5 CM
INTERVENTRICULAR SEPTUM: 1.5 CM (ref 0.6–1.1)
LAAS-AP2: 15.6 CM2
LAAS-AP4: 17.8 CM2
LDLC SERPL CALC-MCNC: 171 MG/DL (ref 0–100)
LEFT ATRIUM AREA SYSTOLE SINGLE PLANE A4C: 17.5 CM2
LEFT ATRIUM SIZE: 4.6 CM
LEFT INTERNAL DIMENSION IN SYSTOLE: 3.1 CM (ref 2.1–4)
LEFT VENTRICULAR INTERNAL DIMENSION IN DIASTOLE: 4.8 CM (ref 3.5–6)
LEFT VENTRICULAR POSTERIOR WALL IN END DIASTOLE: 1.5 CM
LEFT VENTRICULAR STROKE VOLUME: 71 ML
LVSV (TEICH): 71 ML
MCH RBC QN AUTO: 30.4 PG (ref 26.8–34.3)
MCHC RBC AUTO-ENTMCNC: 34.4 G/DL (ref 31.4–37.4)
MCV RBC AUTO: 88 FL (ref 82–98)
MV E'TISSUE VEL-SEP: 5 CM/S
MV PEAK A VEL: 0.97 M/S
MV PEAK E VEL: 62 CM/S
MV STENOSIS PRESSURE HALF TIME: 74 MS
MV VALVE AREA P 1/2 METHOD: 2.97
P AXIS: 59 DEGREES
PLATELET # BLD AUTO: 281 THOUSANDS/UL (ref 149–390)
PMV BLD AUTO: 9.6 FL (ref 8.9–12.7)
POTASSIUM SERPL-SCNC: 3.9 MMOL/L (ref 3.5–5.3)
PR INTERVAL: 184 MS
QRS AXIS: -7 DEGREES
QRSD INTERVAL: 94 MS
QT INTERVAL: 378 MS
QTC INTERVAL: 462 MS
RBC # BLD AUTO: 5.5 MILLION/UL (ref 3.88–5.62)
RIGHT ATRIUM AREA SYSTOLE A4C: 15.6 CM2
RIGHT VENTRICLE ID DIMENSION: 3.4 CM
SL CV LEFT ATRIUM LENGTH A2C: 5.3 CM
SL CV LV EF: 55
SL CV PED ECHO LEFT VENTRICLE DIASTOLIC VOLUME (MOD BIPLANE) 2D: 108 ML
SL CV PED ECHO LEFT VENTRICLE SYSTOLIC VOLUME (MOD BIPLANE) 2D: 37 ML
SODIUM SERPL-SCNC: 134 MMOL/L (ref 135–147)
T WAVE AXIS: 123 DEGREES
TRICUSPID ANNULAR PLANE SYSTOLIC EXCURSION: 2.2 CM
TRIGL SERPL-MCNC: 134 MG/DL
TSH SERPL DL<=0.05 MIU/L-ACNC: 0.87 UIU/ML (ref 0.45–4.5)
VENTRICULAR RATE: 90 BPM
WBC # BLD AUTO: 11.2 THOUSAND/UL (ref 4.31–10.16)

## 2023-04-26 RX ORDER — LABETALOL HYDROCHLORIDE 5 MG/ML
10 INJECTION, SOLUTION INTRAVENOUS EVERY 6 HOURS PRN
Status: DISCONTINUED | OUTPATIENT
Start: 2023-04-26 | End: 2023-04-26

## 2023-04-26 RX ORDER — LISINOPRIL 20 MG/1
40 TABLET ORAL DAILY
Status: DISCONTINUED | OUTPATIENT
Start: 2023-04-27 | End: 2023-04-27 | Stop reason: HOSPADM

## 2023-04-26 RX ORDER — LABETALOL HYDROCHLORIDE 5 MG/ML
20 INJECTION, SOLUTION INTRAVENOUS EVERY 6 HOURS PRN
Status: DISCONTINUED | OUTPATIENT
Start: 2023-04-26 | End: 2023-04-27 | Stop reason: HOSPADM

## 2023-04-26 RX ORDER — AMLODIPINE BESYLATE 10 MG/1
10 TABLET ORAL DAILY
Status: DISCONTINUED | OUTPATIENT
Start: 2023-04-27 | End: 2023-04-27 | Stop reason: HOSPADM

## 2023-04-26 RX ORDER — HYDRALAZINE HYDROCHLORIDE 20 MG/ML
10 INJECTION INTRAMUSCULAR; INTRAVENOUS EVERY 6 HOURS PRN
Status: DISCONTINUED | OUTPATIENT
Start: 2023-04-26 | End: 2023-04-26

## 2023-04-26 RX ORDER — HYDRALAZINE HYDROCHLORIDE 20 MG/ML
10 INJECTION INTRAMUSCULAR; INTRAVENOUS EVERY 6 HOURS PRN
Status: DISCONTINUED | OUTPATIENT
Start: 2023-04-26 | End: 2023-04-27 | Stop reason: HOSPADM

## 2023-04-26 RX ORDER — AMLODIPINE BESYLATE 5 MG/1
5 TABLET ORAL DAILY
Status: DISCONTINUED | OUTPATIENT
Start: 2023-04-26 | End: 2023-04-26

## 2023-04-26 RX ADMIN — ENOXAPARIN SODIUM 40 MG: 40 INJECTION SUBCUTANEOUS at 08:36

## 2023-04-26 RX ADMIN — NICOTINE 1 PATCH: 7 PATCH, EXTENDED RELEASE TRANSDERMAL at 08:35

## 2023-04-26 RX ADMIN — ATORVASTATIN CALCIUM 40 MG: 40 TABLET, FILM COATED ORAL at 17:44

## 2023-04-26 RX ADMIN — METOPROLOL TARTRATE 25 MG: 25 TABLET, FILM COATED ORAL at 15:26

## 2023-04-26 RX ADMIN — LABETALOL HYDROCHLORIDE 10 MG: 5 INJECTION, SOLUTION INTRAVENOUS at 19:50

## 2023-04-26 RX ADMIN — CLOPIDOGREL BISULFATE 75 MG: 75 TABLET ORAL at 08:35

## 2023-04-26 RX ADMIN — HYDROXYZINE HYDROCHLORIDE 25 MG: 25 TABLET ORAL at 21:22

## 2023-04-26 RX ADMIN — AMLODIPINE BESYLATE 5 MG: 5 TABLET ORAL at 08:35

## 2023-04-26 NOTE — PLAN OF CARE
Problem: PAIN - ADULT  Goal: Verbalizes/displays adequate comfort level or baseline comfort level  Description: Interventions:  - Encourage patient to monitor pain and request assistance  - Assess pain using appropriate pain scale  - Administer analgesics based on type and severity of pain and evaluate response  - Implement non-pharmacological measures as appropriate and evaluate response  - Consider cultural and social influences on pain and pain management  - Notify physician/advanced practitioner if interventions unsuccessful or patient reports new pain  Outcome: Progressing     Problem: INFECTION - ADULT  Goal: Absence or prevention of progression during hospitalization  Description: INTERVENTIONS:  - Assess and monitor for signs and symptoms of infection  - Monitor lab/diagnostic results  - Monitor all insertion sites, i e  indwelling lines, tubes, and drains  - Monitor endotracheal if appropriate and nasal secretions for changes in amount and color  - Sunray appropriate cooling/warming therapies per order  - Administer medications as ordered  - Instruct and encourage patient and family to use good hand hygiene technique  - Identify and instruct in appropriate isolation precautions for identified infection/condition  Outcome: Progressing  Goal: Absence of fever/infection during neutropenic period  Description: INTERVENTIONS:  - Monitor WBC    Outcome: Progressing     Problem: SAFETY ADULT  Goal: Patient will remain free of falls  Description: INTERVENTIONS:  - Educate patient/family on patient safety including physical limitations  - Instruct patient to call for assistance with activity   - Consult OT/PT to assist with strengthening/mobility   - Keep Call bell within reach  - Keep bed low and locked with side rails adjusted as appropriate  - Keep care items and personal belongings within reach  - Initiate and maintain comfort rounds  - Make Fall Risk Sign visible to staff  - Offer Toileting every  Hours, in advance of need  - Initiate/Maintain alarm  - Obtain necessary fall risk management equipment:   - Apply yellow socks and bracelet for high fall risk patients  - Consider moving patient to room near nurses station  Outcome: Progressing  Goal: Maintain or return to baseline ADL function  Description: INTERVENTIONS:  -  Assess patient's ability to carry out ADLs; assess patient's baseline for ADL function and identify physical deficits which impact ability to perform ADLs (bathing, care of mouth/teeth, toileting, grooming, dressing, etc )  - Assess/evaluate cause of self-care deficits   - Assess range of motion  - Assess patient's mobility; develop plan if impaired  - Assess patient's need for assistive devices and provide as appropriate  - Encourage maximum independence but intervene and supervise when necessary  - Involve family in performance of ADLs  - Assess for home care needs following discharge   - Consider OT consult to assist with ADL evaluation and planning for discharge  - Provide patient education as appropriate  Outcome: Progressing  Goal: Maintains/Returns to pre admission functional level  Description: INTERVENTIONS:  - Perform BMAT or MOVE assessment daily    - Set and communicate daily mobility goal to care team and patient/family/caregiver  - Collaborate with rehabilitation services on mobility goals if consulted  - Perform Range of Motion  times a day  - Reposition patient every  hours    - Dangle patient  times a day  - Stand patient  times a day  - Ambulate patient  times a day  - Out of bed to chair  times a day   - Out of bed for meals times a day  - Out of bed for toileting  - Record patient progress and toleration of activity level   Outcome: Progressing     Problem: DISCHARGE PLANNING  Goal: Discharge to home or other facility with appropriate resources  Description: INTERVENTIONS:  - Identify barriers to discharge w/patient and caregiver  - Arrange for needed discharge resources and transportation as appropriate  - Identify discharge learning needs (meds, wound care, etc )  - Arrange for interpretive services to assist at discharge as needed  - Refer to Case Management Department for coordinating discharge planning if the patient needs post-hospital services based on physician/advanced practitioner order or complex needs related to functional status, cognitive ability, or social support system  Outcome: Progressing     Problem: Knowledge Deficit  Goal: Patient/family/caregiver demonstrates understanding of disease process, treatment plan, medications, and discharge instructions  Description: Complete learning assessment and assess knowledge base    Interventions:  - Provide teaching at level of understanding  - Provide teaching via preferred learning methods  Outcome: Progressing

## 2023-04-26 NOTE — ASSESSMENT & PLAN NOTE
Patient has numerous major stressful events occurring in his life simultaneously in the past few months  We will give Atarax as needed for anxiety

## 2023-04-26 NOTE — SPEECH THERAPY NOTE
Speech Language/Pathology      Patient passed nursing dysphagia screen; presently tolerating oral diet  Need for formal evaluation of swallow function is not indicated at this time  Please re-order should status change or concerns arise       Myles Spencer [address], [address] [facility]  Speech-Language Pathologist  [place] #[id number]  [place] #[id number]

## 2023-04-26 NOTE — ASSESSMENT & PLAN NOTE
Patient reported while getting into the bathtub yesterday he experienced posterior head pain  He did not describe it as a headache but  throbbing on the outside of his skull  Shortly after the headache he was experiencing tingling on his right lip as well as radiating down his right arm  His thumb had significant paresthesia  Paresthesia in his thumb has since resolved      We will need to hold Plavix and ASA after today due to possible pending spinal tap  Awaiting hemoglobin A1c  Lipid panel showed 230 total cholesterol, triglyceride 134, HDL 32,   MRI of brain showed subcentimeter lesion within the posterior right centrum semiovale demonstrating central cystic change and peripheral rim of hyperintense T2/FLAIR signal with mild diffusion restriction which raises concern for demyelinating disease   CT of brain showed age indeterminate hypodensities  CT of head and neck showed no stenosis/hemorrhage  Neurology consulted- will be conducting a spinal tap some point this week   Possible numerous areas of infarct  BP has no goal of being normotensive, added amlodipine and metoprolol, will likely need to continue to  be adjusted

## 2023-04-26 NOTE — UTILIZATION REVIEW
OBSERVATION 4/25/23 @ [address] [facility] VS [facility]    Initial Clinical Review    Admission: Date/Time/Statement:   Admission Orders (From admission, onward)     Ordered        04/26/23 1403  Inpatient Admission  Once            04/25/23 1050  Place in Observation  Once                      Orders Placed This Encounter   Procedures   • Place in Observation     Standing Status:   Standing     Number of Occurrences:   1     Order Specific Question:   Level of Care     Answer:   Med Surg [16]   • Inpatient Admission     Standing Status:   Standing     Number of Occurrences:   1     Order Specific Question:   Level of Care     Answer:   Med Surg [16]     Order Specific Question:   Estimated length of stay     Answer:   More than 2 Midnights     Order Specific Question:   Certification     Answer:   I certify that inpatient services are medically necessary for this patient for a duration of greater than two midnights  See H&P and MD Progress Notes for additional information about the patient's course of treatment  ED Arrival Information     Expected   -    Arrival   4/25/2023 10:01    Acuity   Urgent            Means of arrival   Ambulance    Escorted by   [place] [id number]   Hospitalist    Admission type   Emergency            Arrival complaint   -           Chief Complaint   Patient presents with   • Facial Numbness     Pt states he has numbness in the right side of his lips  Pt states he also had a headache last night that has subsided  Per EMS the pt's BP was elevated  No medical hx        Initial Presentation: 49 y o  male to the ED from home via EMS  with complaints of right sided facial numbness  Admitted under observation then converted to inpatient for   Stroke like symptoms, htn, anxiety  H/o tobacco abuse, anxiety  Has had a lot of stressful events in life recently  Arrives as a stroke alert with elevated BP   NIHSS 1   As per neurology at bedside in ED, ct head, cta head/neck show no acute findings  NO TNK due to Unclear time of onset outside appropriate time window  Check MRI  Start atorvastatin  PT/OT  MOst of the numbness in his face has resolved, but continues with lip tingling  Given a dose of iv labetalol in the ED  Given asa, plavix  Date: 4/26      Plan for LP at some time  Continue to hold plavix and asa for planned LP  Numbness has completely resolved  Neurology consult: No previous neurology history  Symptom free currently  MRI shows: subcentimeter lesion within the posterior right centrum semiovale demonstrating central cystic change and peripheral rim of hyperintense T2/FLAIR signal with mild diffusion restriction which raises concern for demyelinating disease, low-lying cerebellar tonsils without meeting criteria for Chiari I malformation  Family h/o lupus  Benefit from spinal tap  Will have to wait for plavix to clear prior to tap  Begin to normalize bp         ED Triage Vitals   Temperature Pulse Respirations Blood Pressure SpO2   04/25/23 1030 04/25/23 1006 04/25/23 1006 04/25/23 1006 04/25/23 1006   97 8 °F (36 6 °C) 92 18 (!) 217/108 98 %      Temp Source Heart Rate Source Patient Position - Orthostatic VS BP Location FiO2 (%)   04/25/23 1030 04/25/23 1006 04/25/23 1100 04/25/23 1006 --   Oral Monitor Lying Right arm       Pain Score       04/25/23 1659       No Pain          Wt Readings from Last 1 Encounters:   04/26/23 95 3 kg (210 lb)     Additional Vital Signs:   /Time Temp Pulse Resp BP MAP (mmHg) SpO2 O2 Device Patient Position - Orthostatic VS   04/27/23 1100 97 5 °F (36 4 °C) 71 16 154/84 116 92 % None (Room air) Lying   04/27/23 10:58:07 97 5 °F (36 4 °C) -- -- -- -- -- -- --   04/27/23 0818 -- 71 -- -- -- -- -- --   04/27/23 0700 98 4 °F (36 9 °C) 77 18 147/90 109 93 % None (Room air) Lying   04/27/23 04:24:31 -- 80 -- 163/91 115 92 % -- --   04/27/23 0300 98 5 °F (36 9 °C) 69 18 163/91 115 91 % None (Room air) Lying 04/26/23 23:48:25 -- 70 -- 139/71 94 89 % Abnormal  -- --   04/26/23 23:03:38 98 4 °F (36 9 °C) 73 -- 176/102 Abnormal  127 94 % -- --   04/26/23 2300 98 4 °F (36 9 °C) 70 18 176/102 Abnormal  127 90 % None (Room air) Lying       /Time Temp Pulse Resp BP MAP (mmHg) SpO2 O2 Device Patient Position - Orthostatic VS   04/26/23 07:34:56 97 8 °F (36 6 °C) 72 -- 210/107 Abnormal  141 97 % -- --   04/26/23 0500 97 9 °F (36 6 °C) 74 19 178/89 Abnormal  119 96 % None (Room air) Lying   04/26/23 04:53:42 97 9 °F (36 6 °C) 77 19 178/89 Abnormal  119 94 % -- --   04/26/23 03:13:45 -- 69 -- 154/80 105 89 % Abnormal  -- --   04/26/23 0300 98 °F (36 7 °C) 79 19 154/80 105 95 % None (Room air) Lying   04/26/23 0100 98 °F (36 7 °C) 75 19 174/99 Abnormal  124 93 % None (Room air) Lying   04/26/23 00:59:40 -- 75 -- 174/99 Abnormal  124 93 % -- --   04/25/23 2335 98 °F (36 7 °C) 76 -- 181/104 Abnormal  130 98 % -- --   04/25/23 2300 98 °F (36 7 °C) 76 19 196/98 Abnormal  -- 93 % None (Room air) Lying   04/25/23 21:46:41 -- 80 -- 191/99 Abnormal  130 96 % -- --   04/25/23 2100 98 5 °F (36 9 °C) 80 18 180/94 Abnormal  -- 96 % None (Room air) Lying   04/25/23 1900 98 5 °F (36 9 °C) 83 16 188/92 Abnormal  -- -- -- Sitting   04/25/23 1800 98 5 °F (36 9 °C) 75 18 189/95 Abnormal  -- 98 % None (Room air) Sitting   04/25/23 1704 97 6 °F (36 4 °C) 80 16 197/103 Abnormal  134 95 % None (Room air) Lying   04/25/23 1700 97 6 °F (36 4 °C) 82 -- 203/124 Abnormal  150 96 % None (Room air) Lying   04/25/23 1300 -- 66 19 182/91 Abnormal  -- 97 % None (Room air) Lying   04/25/23 1200 -- 73 23 Abnormal  204/109 Abnormal  -- 98 % None (Room air) Lying   04/25/23 1100 -- 81 22 220/110 Abnormal  -- 98 % None (Room air) Lying   04/25/23 10:58:25 -- -- -- -- -- -- None (Room air) --   04/25/23 1045 -- 86 26 Abnormal  215/112 Abnormal  -- 97 % None (Room air) --   04/25/23 1030 97 8 °F (36 6 °C) 88 18 207/118 Abnormal  -- 98 % None (Room air) --   04/25/23 1015 -- 83 16 217/108 Abnormal  -- 97 % None (Room air) --   04/25/23 1006 -- 92 18 217/108 Abnormal  -- 98 % None (Room air) --       Pertinent Labs/Diagnostic Test Results:   4/26 ECHO: Left Ventricle: Left ventricular cavity size is normal  Wall thickness is severely increased  There is severe concentric hypertrophy  The left ventricular ejection fraction is 55%  Systolic function is normal  Wall motion is normal  Diastolic function is mildly abnormal, consistent with grade I (abnormal) relaxation  •  Aortic Valve: The aortic valve is trileaflet  The leaflets are mildly thickened  The leaflets are moderately calcified  The leaflets exhibit normal mobility  •  Mitral Valve: There is annular calcification    4/25 EKG: Normal sinus rhythm  Possible Left atrial enlargement  T wave abnormality, consider lateral ischemia  Prolonged QT  Abnormal ECG  No previous ECGs available    MRI brain MS wo and w contrast   Final Result by Jacob Whitman MD (04/26 0645)   1  Periventricular and subcortical white matter lesions which are nonspecific and can be seen with vasculitis, migrainous angiopathy, demyelinating disease or early microangiopathic changes  There is a subcentimeter lesion within the posterior right    centrum semiovale demonstrating central cystic change and peripheral rim of hyperintense T2/FLAIR signal with mild diffusion restriction which raises concern for demyelinating disease  A subcentimeter lesion is noted within the central right cerebellum  There is no pathologic area of intra-axial enhancement to suggest acute demyelination  2  Subcentimeter foci of mild restricted diffusion within the left thalamus and centrum semiovale which can be seen with ischemic changes or demyelination  3  Low-lying cerebellar tonsils without meeting criteria for Chiari I malformation  The study was marked in [facility] for immediate notification           Workstation performed: [id number]         CTA stroke alert (head/neck)   Final Result by Trev Felix MD (04/25 1049)      1  No hemodynamically significant stenosis in the major arteries of the neck  2   No intracranial aneurysm or major intracranial arterial stenosis  3   No acute intracranial hemorrhage  I personally provided preliminary results of this study with Alexey Marrero and Karthik Flores on 4/25/2023 at 10:42 AM                                      Workstation performed: [id number]         CT stroke alert brain   Final Result by Trev Felix MD (04/25 1037)         1  No acute intracranial hemorrhage  2   A few scattered age-indeterminate hypodensities in the parietal lobes bilaterally  Age-indeterminate infarction is in the differential diagnosis  Precocious changes of microangiopathy could be considered if there are cerebrovascular risk factors  Other postinfectious, postinflammatory or demyelinating processes including Lyme disease or multiple sclerosis are in the differential diagnosis  Contrast enhanced MRI of the brain may be of additional use for further characterization              Workstation performed: [id number]         IR spine procedure    (Results Pending)     Results from last 7 days   Lab Units 04/25/23  1012   SARS-COV-2  Negative     Results from last 7 days   Lab Units 04/27/23 0459 04/26/23 0445 04/25/23  1012   WBC Thousand/uL 11 25* 11 20* 12 27*   HEMOGLOBIN g/dL 16 4 16 7 17 2*   HEMATOCRIT % 47 9 48 6 49 6*   PLATELETS Thousands/uL 271 281 276         Results from last 7 days   Lab Units 04/27/23 0459 04/26/23 0445 04/25/23  1012   SODIUM mmol/L 137 134* 133*   POTASSIUM mmol/L 4 1 3 9 3 8   CHLORIDE mmol/L 105 103 101   CO2 mmol/L 25 26 26   ANION GAP mmol/L 7 5 6   BUN mg/dL 16 16 15   CREATININE mg/dL 0 80 0 94 1 01   EGFR ml/min/1 73sq m 105 95 87   CALCIUM mg/dL 9 4 9 5 9 8             Results from last 7 days   Lab Units 04/27/23 0459 04/26/23 0445 04/25/23  1012   GLUCOSE RANDOM mg/dL 118 121 142*             Results from last 7 days   Lab Units 04/25/23  1416 04/25/23  1226 04/25/23  1012   HS TNI 0HR ng/L  --   --  76*   HS TNI 2HR ng/L  --  86*  --    HSTNI D2 ng/L  --  10  --    HS TNI 4HR ng/L 81*  --   --    HSTNI D4 ng/L 5  --   --          Results from last 7 days   Lab Units 04/25/23  1012   PROTIME seconds 13 8   INR  1 08   PTT seconds 27     Results from last 7 days   Lab Units 04/26/23  0445   TSH 3RD GENERATON uIU/mL 0 870             Results from last 7 days   Lab Units 04/25/23  1012   INFLUENZA A PCR  Negative   INFLUENZA B PCR  Negative   RSV PCR  Negative       ED Treatment:   Medication Administration from 04/25/2023 1001 to 04/25/2023 1622       Date/Time Order Dose Route Action     04/25/2023 1113 EDT aspirin tablet 325 mg 325 mg Oral Given     04/25/2023 1113 EDT clopidogrel (PLAVIX) tablet 300 mg 300 mg Oral Given     04/25/2023 1113 EDT labetalol (NORMODYNE) injection 10 mg 10 mg Intravenous Given          Admitting Diagnosis: Facial numbness [R20 0]  Right facial numbness [R20 0]  Abnormal CT scan of head [R93 0]  Stroke-like symptoms [R29 90]  Age/Sex: 49 y o  male  Admission Orders:  Scheduled Medications:  amLODIPine, 10 mg, Oral, Daily  atorvastatin, 40 mg, Oral, QPM  lisinopril, 40 mg, Oral, Daily  metoprolol tartrate, 25 mg, Oral, Q12H [address]  nicotine, 1 patch, Transdermal, Daily      Continuous IV Infusions:     PRN Meds:  hydrALAZINE, 10 mg, Intravenous, Q6H PRN  hydrOXYzine HCL, 25 mg, Oral, Q6H PRN  labetalol, 20 mg, Intravenous, Q6H PRN        IP CONSULT TO NEUROLOGY  IP CONSULT TO NUTRITION SERVICES    Network Utilization Review Department  ATTENTION: Please call with any questions or concerns to [phone] and carefully listen to the prompts so that you are directed to the right person   All voicemails are confidential   Carole Deepika all requests for admission clinical reviews, approved or denied determinations and any other requests to dedicated fax number below belonging to the campus where the patient is receiving treatment   List of dedicated fax numbers for the Facilities:  [address] [place] [address] DENIALS (Administrative/Medical Necessity) [phone]   [address] [place] [address] (Maternity/NICU/Pediatrics) [phone]   [address] [place] [phone]   [address] [phone]   [address] [place] [address] [phone]   [address] [facility] [address] [facility] [phone]

## 2023-04-26 NOTE — PROGRESS NOTES
[address] [facility]  Neurology progress Note Name: Dusty Romberg     MRN: [id number] Unit/Bed#: -01 I Date of Admission: 4/25/2023   Date of Service: 4/26/2023 I Hospital Day: 0    Assessment/Plan   * Stroke-like symptoms versus demyelination  Assessment & Plan  4/26/2023: New to this neurology examiner is this right-hand-dominant 49 y o  male with no previous neurologic history, however he is a recurrent tobacco abuse gentleman who presents to [facility] ED on 4/25/2023 as a stroke alert with right upper and lower lip numbness and right thumb numbness  Symptoms resolved shortly after presentation he has not had recurrences  - BP elevated on presentation: 217/108  - NIHSS of 1, he was not a thrombolytic candidate   - CT head:  · Unremarkable for acute hemorrhage  · Scattered age-indeterminate hypodensities in bilateral parietal lobes   - CTA head and neck:   · Unremarkable for large vessel occlusion or critical stenosis  - MRI brain: There was a subcentimeter foci of mild restricted diffusion within the left thalamus and centrum semiovale which can be seen with ischemic changes or demyelination  He was noted to have a subcentimeter lesion within the posterior right centrum semiovale demonstrating central cystic change and peripheral rim of hyperintense T2/FLAIR signal with mild diffusion restriction which raises concern for demyelinating disease  A subcentimeter lesion is noted within the central right cerebellum as well  There is no pathologic area of intra-axial enhancement to suggest acute demyelination  Also low-lying cerebellar tonsils without meeting criteria for Chiari I malformation  Past echocardiogram has been done but the read is pending  His labs are pending, specifically his A1c his lipid panel demonstrates a markedly elevated LDL  On today's exam this patient has no focality  He was able to tolerate high-level maneuvers without any focality as well    This patient's family history includes that of an aunt as well as a sister both of whom are  from lupus  His father was also a type I diabetic  He denies any other history of autoimmune disease to his knowledge  Plan:  -Patient will likely benefit from a spinal tap  However he had 300 mg of Plavix yesterday and his regular dose this morning in addition to his aspirin  He likely will tap him possibly tomorrow   - Recommend: Fasting lipid panel, hemoglobin A1c, TSH  -We will hold his Plavix after today for the possibility of a tap later this week  - Okay to start atorvastatin 40 mg daily  - We can begin to normalize his blood pressure  - Goal euglycemia, normothermia  - Frequent neurochecks  - PT/OT/speech  - Stroke education  - Medical management and supportive care per primary team, notify with changes    Tobacco abuse  Assessment & Plan  - Reportedly smokes 1 pack/day for the last 4-5 months  This patient quit smoking on a routine basis approximately 10 to 15 years ago  - Smoking cessation education was done and discussed again with this patient  He reports that there are significant stressors in his life right now causing him to smoke  This patient will need to be seen on an outpatient basis hopefully within a month or 2 in any of our neurology offices here or in the [facility] at the [place] or [place] campuses  Much remains to be determined given that this gentlemen's may still need a spinal tap  Subjective/Objective     Subjective: I do not have any other symptoms anymore I feel okay    ROS: Patient denies all complaints  He reports he feels well  He is a reliable historian      Medication Dose Route Frequency   • amLODIPine  5 mg Oral Daily   • atorvastatin  40 mg Oral QPM   • clopidogrel  75 mg Oral Daily   • enoxaparin  40 mg Subcutaneous Daily   • hydrOXYzine HCL  25 mg Oral Q6H PRN   • labetalol  10 mg Intravenous Q6H PRN   • nicotine  1 patch Transdermal Daily     •  hydrOXYzine HCL  • "labetalol    Vitals: Blood pressure (!) 170/101, pulse 81, temperature 97 8 °F (36 6 °C), temperature source Oral, resp  rate 18, height 5' 9\" (1 753 m), weight 95 3 kg (210 lb), SpO2 96 %  ,Body mass index is 31 01 kg/m²  Physical Exam:     Rachid Arce seen in:, He has sat at the edge of the bed for film review after he was tested with high-level balance maneuvers  General appearance: alert,   Neck, Lungs, Heart, & abdomen: WNL  Extremities: atraumatic, no cyanosis or edema    Neurologic:   Mental status: Alert, oriented, thought content appropriate  CN: exam EOM's I, Gaze conjugate  Non lateralizing sensory & motor exam, (PP not tested on face)  Reminder CNVIII-XII normal    Motor: full power age appropriate x 4 limbs  Sensory: grossly intact  X 4 limbs, PP not tested  Cerebellar: no ataxia or past pointing w pronation from a traditional standing Romberg position  Finger taps age appropriate  Gait: Fluid smooth, no LOB w cadance or directional change  He was able to maintain a 1 footed stance for approximately 7 to 10 seconds  DTR's: Age appropriate,  Plantars: downgoing       Lab Results:   I have personally reviewed pertinent reports    , CBC:   Results from last 7 days   Lab Units 04/26/23  0445 04/25/23  1012   WBC Thousand/uL 11 20* 12 27*   RBC Million/uL 5 50 5 61   HEMOGLOBIN g/dL 16 7 17 2*   HEMATOCRIT % 48 6 49 6*   MCV fL 88 88   PLATELETS Thousands/uL 281 276   , BMP/CMP:   Results from last 7 days   Lab Units 04/26/23  0445 04/25/23  1012   SODIUM mmol/L 134* 133*   POTASSIUM mmol/L 3 9 3 8   CHLORIDE mmol/L 103 101   CO2 mmol/L 26 26   BUN mg/dL 16 15   CREATININE mg/dL 0 94 1 01   CALCIUM mg/dL 9 5 9 8   EGFR ml/min/1 73sq m 95 87   , Vitamin B12:   , HgBA1C:   , TSH:   Results from last 7 days   Lab Units 04/26/23  0445   TSH 3RD GENERATON uIU/mL 0 870   , Coagulation:   Results from last 7 days   Lab Units 04/25/23  1012   INR  1 08   , Lipid Profile:   Results from last 7 days   Lab Units " 04/26/23  0445   HDL mg/dL 32*   LDL CALC mg/dL 171*   TRIGLYCERIDES mg/dL 134        Imaging Studies: I have personally reviewed pertinent films in PACS and I personally reviewed his MRI as well as I have reviewed the films with the patient at the bedside  There were a few abnormalities on his MRI on my review raising the question also of a possible demyelination  Radiology reports his MRI as follows:   1  Periventricular and subcortical white matter lesions which are nonspecific and can be seen with vasculitis, migrainous angiopathy, demyelinating disease or early microangiopathic changes  There is a subcentimeter lesion within the posterior right   centrum semiovale demonstrating central cystic change and peripheral rim of hyperintense T2/FLAIR signal with mild diffusion restriction which raises concern for demyelinating disease  A subcentimeter lesion is noted within the central right cerebellum  There is no pathologic area of intra-axial enhancement to suggest acute demyelination  2  Subcentimeter foci of mild restricted diffusion within the left thalamus and centrum semiovale which can be seen with ischemic changes or demyelination  3  Low-lying cerebellar tonsils without meeting criteria for Chiari I malformation  EEG, Echo, Pathology, and Other Studies: His echocardiogram done as part of the stroke work-up is pending  Counseling / Coordination of Care  Total time spent today Approximately 60 minutes  Greater than 50% of total time was spent with the patient and / or family counseling and / or coordination of care  A description of the counseling / coordination of care: All of the above was discussed and reviewed with this patient at the bedside  The a question of demyelination, and autoimmune disease were discussed with this patient  We discussed his personal history as well as side of his family medical history  With several questions    We will all answered within the limits of the work-up at this time  He understands that he may benefit from a spinal tap yet to be done to help determine his final diagnosis etc   No further questions at this time

## 2023-04-26 NOTE — OCCUPATIONAL THERAPY NOTE
Occupational Therapy Screen        Patient Name: Traci MOLINA Date: 4/26/2023 04/26/23 1008   OT Last Visit   OT Visit Date 04/26/23   Note Type   Note type Screen   Additional Comments OT order received  Patient reports independent with ADLs and mobility in room and bathroom  No acute OT needs identified at this time to warrant OT evaluation  D/C OT orders

## 2023-04-26 NOTE — CASE MANAGEMENT
Case Management Assessment & Discharge Planning Note    Patient name Khushi Clinton  Location [facility] 203/-02 MRN [id number]  : 1975 Date 2023       Current Admission Date: 2023  Current Admission Diagnosis:Stroke-like symptoms   Patient Active Problem List    Diagnosis Date Noted   • Stroke-like symptoms 2023   • Tobacco abuse 2023   • Anxiety 2023   • HTN (hypertension) with goal to be determined 2023      LOS (days): 0  Geometric Mean LOS (GMLOS) (days):   Days to GMLOS:     OBJECTIVE:              Current admission status: Observation       Preferred Pharmacy:   [address] [place] [address] [id number] [address]  Phone: [phone] Fax: [phone]    Primary Care Provider: No primary care provider on file  Primary Insurance: [address] [place] [address]  Secondary Insurance:     ASSESSMENT:  Active Health Care Proxies    There are no active Health Care Proxies on file  Advance Directives  Does patient have a [address]?: Yes  Does patient have Advance Directives?: Yes  Advance Directives: Living will, Power of  for health care, Power of  for finance  Primary Contact: Pt stated it is SO Alejandra Dennis         Readmission Root Cause  30 Day Readmission: No    Patient Information  Admitted from[de-identified] Home  Mental Status: Alert  During Assessment patient was accompanied by: Not accompanied during assessment  Assessment information provided by[de-identified] Patient  Primary Caregiver: Self  Support Systems: Spouse/significant other  [place] of Residence:  ([place])  What city do you live in?: [address] [place] [address] entry access options   Select all that apply : Stairs  Number of steps to enter home : 6  Do the steps have railings?: Yes  Type of Current Residence: Apartment  Floor Level: 1  Upon entering residence, is there a bedroom on the main floor (no further steps)?: Yes  Upon entering residence, is there a bathroom on the main floor (no further steps)?: Yes  In the last 12 months, was there a time when you were not able to pay the mortgage or rent on time?: No  In the last 12 months, how many places have you lived?: 1  In the last 12 months, was there a time when you did not have a steady place to sleep or slept in a shelter (including now)?: No  Homeless/housing insecurity resource given?: No  Living Arrangements: Lives Alone  Is patient a ?: No    Activities of Daily Living Prior to Admission  Functional Status: Independent  Completes ADLs independently?: Yes  Ambulates independently?: Yes  Does patient use assisted devices?: No  Does patient currently own DME?: No  Does patient have a history of Outpatient Therapy (PT/OT)?: No  Does the patient have a history of Short-Term Rehab?: No  Does patient have a history of HHC?: No  Does patient currently have [employer]?: No         Patient Information Continued  Income Source: Employed  Does patient have prescription coverage?: Yes  Within the past 12 months, you worried that your food would run out before you got the money to buy more : Never true  Within the past 12 months, the food you bought just didn't last and you didn't have money to get more : Never true  Food insecurity resource given?: No  Does patient receive dialysis treatments?: No  Does patient have a history of substance abuse?: No  Does patient have a history of Mental Health Diagnosis?: No         Means of Transportation  Means of Transport to Appts[de-identified] Drives Self  In the past 12 months, has lack of transportation kept you from medical appointments or from getting medications?: No  In the past 12 months, has lack of transportation kept you from meetings, work, or from getting things needed for daily living?: No  Was application for public transport provided?: N/A        DISCHARGE DETAILS:    Discharge planning discussed with[de-identified] Patient  Freedom of Choice: Yes  Comments - Freedom of Choice: NO DC needs anticipated  CM contacted family/caregiver?: No- see comments                  Requested [address]         Is the patient interested in [place] [address] at discharge?: No    DME Referral Provided  Referral made for DME?: No    Other Referral/Resources/Interventions Provided:  Referral Comments: NO DC needs anticipated         Treatment Team Recommendation: Home  Discharge Destination Plan[de-identified] Home  Transport at Discharge : Automobile, Family

## 2023-04-26 NOTE — PHYSICAL THERAPY NOTE
Physical Therapy Evaluation     Patient's Name: Aleah Molina    Admitting Diagnosis  Facial numbness [R20 0]  Right facial numbness [R20 0]  Abnormal CT scan of head [R93 0]  Stroke-like symptoms [R29 90]    Problem List  Patient Active Problem List   Diagnosis    Stroke-like symptoms    Tobacco abuse    Anxiety    HTN (hypertension) with goal to be determined       Past Medical History  History reviewed  No pertinent past medical history  Past Surgical History  History reviewed  No pertinent surgical history  04/26/23 0731   PT Last Visit   PT Visit Date 04/26/23   Note Type   Note type Evaluation   Pain Assessment   Pain Assessment Tool 0-10   Pain Score No Pain   Restrictions/Precautions   Weight Bearing Precautions Per Order No   Braces or Orthoses Other (Comment)  (none reported)   Home Living   Type of Home Other (Comment)  (Condo)   Home Layout Multi-level;Stairs to enter with rails; Performs ADLs on one level  ([address], elevator to condo)   Bathroom Shower/Tub Walk-in shower   Bathroom Toilet Standard   Bathroom Equipment Built-in shower seat;Grab bars in shower   [address] Other (Comment)  (none reported)   Additional Comments Ambulates without device at baseline   Prior Function   Level of [place] Independent with functional mobility; Independent with ADLs; Independent with IADLS   Lives With Alone   Receives Help From Family   IADLs Independent with driving; Independent with medication management; Independent with meal prep   Falls in the last 6 months 0   Vocational   (owns clothing stores)   Comments Pt reports mother is currently on hospice living in the [facility]   General   Family/Caregiver Present No   Cognition   Overall Cognitive Status WFL   Arousal/Participation Alert   Orientation Level Oriented X4   Memory Within functional limits   Following Commands Follows all commands and directions without difficulty   Comments Pt agreeable to PT evaluation RLE Assessment   RLE Assessment WFL   LLE Assessment   LLE Assessment WFL   Vision-Basic Assessment   Current Vision No visual deficits   Light Touch   RLE Light Touch Grossly intact   LLE Light Touch Grossly intact   Bed Mobility   Supine to Sit 6  Modified independent   Additional items HOB elevated   Sit to Supine 6  Modified independent   Additional items HOB elevated   Transfers   Sit to Stand 7  Independent   Stand to Sit 7  Independent   Additional Comments no device   Ambulation/Elevation   Gait pattern WNL   Gait Assistance 7  Independent   Assistive Device None   Distance 80'   Stair Management Assistance Not tested   Ambulation/Elevation Additional Comments Ambulation distance limited by therapist due to HTN, pt asymptomatic throughout   Balance   Static Sitting Normal   Dynamic Sitting Normal   Static Standing Good   Dynamic Standing Fair +   Ambulatory Fair +   Activity Tolerance   Activity Tolerance Patient tolerated treatment well   Nurse Made Aware NATHALIA Cha   Assessment   Prognosis Excellent   Assessment Pt is 49 y o  male seen for PT evaluation s/p [facility] w/ Stroke-like symptoms  PT consulted to assess pt's functional mobility and d/c needs  Order placed for PT eval and tx, w/ up w/ A order  Comorbidities affecting pt's physical performance at time of assessment include: HTN, anxiety, stroke like symptoms  PTA, pt was independent w/ all functional mobility w/ no device, ambulates community distances and elevations, ambulates household distances, has [address] and lives alone in one level condo  Personal factors affecting pt at time of IE include: stairs to enter home  Please find objective findings from PT assessment regarding body systems outlined above  The following objective measures performed on IE: Barthel Index: 100/100, Modified Sirena: 0 no symptoms at all and AM-PAC 6-Clicks: 19/65   Pt's clinical presentation is currently stable  seen in pt's presentation of continued need for medical management and monitoring  From PT/mobility standpoint, pt appears to be functioning close to or at mobility baseline, therefore no further immediate skilled PT needs are warranted at this time  Pt currently performing all phases of functional mobility at independent level without need for AD  Recommend pt continue to mobilize ad kiya while here  Recommend pt return to previous living environment once medically cleared  Will sign off, D/C PT  Please reconsult if further immediate skilled PT needs are warranted     Barriers to Discharge None   Recommendation   PT Discharge Recommendation No rehabilitation needs   AM-PAC Basic Mobility Inpatient   Turning in Flat Bed Without Bedrails 4   Lying on Back to Sitting on Edge of Flat Bed Without Bedrails 4   Moving Bed to Chair 4   Standing Up From Chair Using Arms 4   Walk in Room 4   Climb 3-5 Stairs With Railing 4   Basic Mobility Inpatient Raw Score 24   Basic Mobility Standardized Score 57 68   Highest Level Of Mobility   JH-HLM Goal 8: Walk 250 feet or more   JH-HLM Achieved 7: Walk 25 feet or more   Modified Gem Scale   Modified Sirena Scale 0   Barthel Index   Feeding 10   Bathing 5   Grooming Score 5   Dressing Score 10   Bladder Score 10   Bowels Score 10   Toilet Use Score 10   Transfers (Bed/Chair) Score 15   Mobility (Level Surface) Score 15   Stairs Score 10   Barthel Index Score 100       Diane Davis, PT

## 2023-04-26 NOTE — ASSESSMENT & PLAN NOTE
Initially permissive hypertension  New goal is normotension  I started amlodipine and added metoprolol throughout the day, will likely need further adjustment  Currently 170/110

## 2023-04-26 NOTE — PROGRESS NOTES
[facility]  Progress Note  Name: April Cookie  MRN: [id number]  Unit/Bed#: MS [facility] I Date of Admission: 4/25/2023   Date of Service: 4/26/2023 I Hospital Day: 0    Assessment/Plan   HTN (hypertension) with goal to be determined  Assessment & Plan  Initially permissive hypertension  New goal is normotension  I started amlodipine and added metoprolol throughout the day, will likely need further adjustment  Currently 170/110    Anxiety  Assessment & Plan  Patient has numerous major stressful events occurring in his life simultaneously in the past few months  We will give Atarax as needed for anxiety    Tobacco abuse  Assessment & Plan  Patient recently started smoking approximately 4 months ago due to recent stresses  He reports smoking up to a pack and a half a day  Smoking cessation education  We will provide nicotine patch    * Stroke-like symptoms versus demyelination  Assessment & Plan  Patient reported while getting into the bathtub yesterday he experienced posterior head pain  He did not describe it as a headache but  throbbing on the outside of his skull  Shortly after the headache he was experiencing tingling on his right lip as well as radiating down his right arm  His thumb had significant paresthesia  Paresthesia in his thumb has since resolved      We will need to hold Plavix and ASA after today due to possible pending spinal tap  Awaiting hemoglobin A1c  Lipid panel showed 230 total cholesterol, triglyceride 134, HDL 32,   MRI of brain showed subcentimeter lesion within the posterior right centrum semiovale demonstrating central cystic change and peripheral rim of hyperintense T2/FLAIR signal with mild diffusion restriction which raises concern for demyelinating disease   CT of brain showed age indeterminate hypodensities  CT of head and neck showed no stenosis/hemorrhage  Neurology consulted- will be conducting a spinal tap some point this week   Possible numerous areas of infarct  BP has no goal of being normotensive, added amlodipine and metoprolol, will likely need to continue to  be adjusted           VTE Pharmacologic Prophylaxis: VTE Score: 5 High Risk (Score >/= 5) - Pharmacological DVT Prophylaxis Ordered: enoxaparin (Lovenox)  Sequential Compression Devices Ordered  Patient Centered Rounds: I performed bedside rounds with nursing staff today  Discussions with Specialists or Other Care Team Provider: Cm, Neuro    Education and Discussions with Family / Patient: Patient declined call to   Total Time Spent on Date of Encounter in care of patient: 45 minutes This time was spent on one or more of the following: performing physical exam; counseling and coordination of care; obtaining or reviewing history; documenting in the medical record; reviewing/ordering tests, medications or procedures; communicating with other healthcare professionals and discussing with patient's family/caregivers  Current Length of Stay: 0 day(s)  Current Patient Status: Inpatient   Certification Statement: The patient will continue to require additional inpatient hospital stay due to Will likely need spinal tap to rule out MS versus other demyelinating disease  Discharge Plan: Anticipate discharge in 48 hrs to home  Code Status: Level 1 - Full Code    Subjective:   Patient reports feeling back to normal   He no longer has numbness and tingling in his upper lip or in his arm  He denies chest pain/pressure, palpitations, lightheadedness, nausea, chills, or shortness of breath  Objective:     Vitals:   Temp (24hrs), Av °F (36 7 °C), Min:97 6 °F (36 4 °C), Max:98 5 °F (36 9 °C)    Temp:  [97 6 °F (36 4 °C)-98 5 °F (36 9 °C)] 97 8 °F (36 6 °C)  HR:  [63-83] 81  Resp:  [16-19] 18  BP: (154-210)/() 170/101  SpO2:  [89 %-98 %] 96 %  Body mass index is 31 01 kg/m²       Input and Output Summary (last 24 hours):   No intake or output data in the 24 hours ending 04/26/23 1421    Physical Exam:   Physical Exam  Vitals and nursing note reviewed  Constitutional:       Appearance: He is normal weight  HENT:      Head: Normocephalic  Nose: Nose normal       Mouth/Throat:      Mouth: Mucous membranes are moist       Pharynx: Oropharynx is clear  Eyes:      General: No scleral icterus  Conjunctiva/sclera: Conjunctivae normal       Pupils: Pupils are equal, round, and reactive to light  Cardiovascular:      Rate and Rhythm: Normal rate and regular rhythm  Heart sounds: No murmur heard  No friction rub  No gallop  Pulmonary:      Effort: Pulmonary effort is normal  No respiratory distress  Breath sounds: Normal breath sounds  No stridor  No wheezing, rhonchi or rales  Abdominal:      General: Abdomen is flat  Palpations: Abdomen is soft  Musculoskeletal:         General: Normal range of motion  Cervical back: Normal range of motion and neck supple  Right lower leg: No edema  Left lower leg: No edema  Lymphadenopathy:      Cervical: No cervical adenopathy  Skin:     General: Skin is warm  Coloration: Skin is not jaundiced or pale  Findings: No bruising, erythema or lesion  Neurological:      General: No focal deficit present  Mental Status: He is alert and oriented to person, place, and time  Mental status is at baseline  Cranial Nerves: No cranial nerve deficit  Motor: No weakness  Psychiatric:         Mood and Affect: Mood normal          Behavior: Behavior normal          Thought Content:  Thought content normal            Additional Data:     Labs:  Results from last 7 days   Lab Units 04/26/23  0445   WBC Thousand/uL 11 20*   HEMOGLOBIN g/dL 16 7   HEMATOCRIT % 48 6   PLATELETS Thousands/uL 281     Results from last 7 days   Lab Units 04/26/23  0445   SODIUM mmol/L 134*   POTASSIUM mmol/L 3 9   CHLORIDE mmol/L 103   CO2 mmol/L 26   BUN mg/dL 16   CREATININE mg/dL 0 94   ANION GAP mmol/L 5   CALCIUM mg/dL 9 5   GLUCOSE RANDOM mg/dL 121     Results from last 7 days   Lab Units 04/25/23  1012   INR  1 08                   Lines/Drains:  Invasive Devices     Peripheral Intravenous Line  Duration           Peripheral IV 04/25/23 Left Antecubital 1 day                  Telemetry:  Telemetry Orders (From admission, onward)             48 Hour Telemetry Monitoring  Continuous x 48 hours        References:    Telemetry Guidelines   Question:  Reason for 48 Hour Telemetry  Answer:  Acute CVA (<24 hrs old, hemispheric strokes, selected brainstem strokes, cardiac arrhythmias)                 Telemetry Reviewed: Normal Sinus Rhythm  Indication for Continued Telemetry Use: No indication for continued use  Will discontinue  Imaging: Reviewed radiology reports from this admission including: CT head and MRI brain    Recent Cultures (last 7 days):         Last 24 Hours Medication List:   Current Facility-Administered Medications   Medication Dose Route Frequency Provider Last Rate   • amLODIPine  5 mg Oral Daily Milagro Ocampo PA-C     • atorvastatin  40 mg Oral QPM Milagro Ocampo PA-C     • clopidogrel  75 mg Oral Daily Milagro Ocampo PA-C     • enoxaparin  40 mg Subcutaneous Daily Milagro Ocampo PA-C     • hydrOXYzine HCL  25 mg Oral Q6H PRN Milagro Ocampo PA-C     • labetalol  10 mg Intravenous Q6H PRN Milagro Ocampo PA-C     • metoprolol tartrate  25 mg Oral Q12H [facility] JES Ocampo     • nicotine  1 patch Transdermal Daily Milagro Ocampo PA-C          Today, Patient Was Seen By: Milagro Ocampo PA-C    **Please Note: This note may have been constructed using a voice recognition system  **

## 2023-04-27 VITALS
HEART RATE: 81 BPM | SYSTOLIC BLOOD PRESSURE: 172 MMHG | TEMPERATURE: 97.5 F | BODY MASS INDEX: 31.1 KG/M2 | DIASTOLIC BLOOD PRESSURE: 90 MMHG | WEIGHT: 210 LBS | OXYGEN SATURATION: 98 % | HEIGHT: 69 IN | RESPIRATION RATE: 16 BRPM

## 2023-04-27 LAB
ANA SER QL IA: NEGATIVE
ANION GAP SERPL CALCULATED.3IONS-SCNC: 7 MMOL/L (ref 4–13)
B BURGDOR IGG+IGM SER-ACNC: <0.2 AI
BUN SERPL-MCNC: 16 MG/DL (ref 5–25)
CALCIUM SERPL-MCNC: 9.4 MG/DL (ref 8.4–10.2)
CHLORIDE SERPL-SCNC: 105 MMOL/L (ref 96–108)
CO2 SERPL-SCNC: 25 MMOL/L (ref 21–32)
CREAT SERPL-MCNC: 0.8 MG/DL (ref 0.6–1.3)
ERYTHROCYTE [DISTWIDTH] IN BLOOD BY AUTOMATED COUNT: 13 % (ref 11.6–15.1)
ERYTHROCYTE [SEDIMENTATION RATE] IN BLOOD: 15 MM/HOUR (ref 0–14)
GFR SERPL CREATININE-BSD FRML MDRD: 105 ML/MIN/1.73SQ M
GLUCOSE SERPL-MCNC: 118 MG/DL (ref 65–140)
HCT VFR BLD AUTO: 47.9 % (ref 36.5–49.3)
HGB BLD-MCNC: 16.4 G/DL (ref 12–17)
MCH RBC QN AUTO: 30.3 PG (ref 26.8–34.3)
MCHC RBC AUTO-ENTMCNC: 34.2 G/DL (ref 31.4–37.4)
MCV RBC AUTO: 88 FL (ref 82–98)
PLATELET # BLD AUTO: 271 THOUSANDS/UL (ref 149–390)
PMV BLD AUTO: 9.9 FL (ref 8.9–12.7)
POTASSIUM SERPL-SCNC: 4.1 MMOL/L (ref 3.5–5.3)
RBC # BLD AUTO: 5.42 MILLION/UL (ref 3.88–5.62)
SODIUM SERPL-SCNC: 137 MMOL/L (ref 135–147)
WBC # BLD AUTO: 11.25 THOUSAND/UL (ref 4.31–10.16)

## 2023-04-27 RX ORDER — AMLODIPINE BESYLATE 10 MG/1
10 TABLET ORAL DAILY
Qty: 30 TABLET | Refills: 0 | Status: SHIPPED | OUTPATIENT
Start: 2023-04-28

## 2023-04-27 RX ORDER — LISINOPRIL 40 MG/1
40 TABLET ORAL DAILY
Qty: 30 TABLET | Refills: 0 | Status: SHIPPED | OUTPATIENT
Start: 2023-04-28

## 2023-04-27 RX ORDER — ATORVASTATIN CALCIUM 40 MG/1
40 TABLET, FILM COATED ORAL EVERY EVENING
Qty: 30 TABLET | Refills: 0 | Status: SHIPPED | OUTPATIENT
Start: 2023-04-27

## 2023-04-27 RX ORDER — HYDROXYZINE HYDROCHLORIDE 25 MG/1
25 TABLET, FILM COATED ORAL EVERY 6 HOURS PRN
Qty: 30 TABLET | Refills: 0 | Status: SHIPPED | OUTPATIENT
Start: 2023-04-27

## 2023-04-27 RX ADMIN — METOPROLOL TARTRATE 25 MG: 25 TABLET, FILM COATED ORAL at 08:18

## 2023-04-27 RX ADMIN — NICOTINE 1 PATCH: 7 PATCH, EXTENDED RELEASE TRANSDERMAL at 08:17

## 2023-04-27 RX ADMIN — AMLODIPINE BESYLATE 10 MG: 10 TABLET ORAL at 08:18

## 2023-04-27 RX ADMIN — LISINOPRIL 40 MG: 20 TABLET ORAL at 08:18

## 2023-04-27 RX ADMIN — HYDRALAZINE HYDROCHLORIDE 10 MG: 20 INJECTION INTRAMUSCULAR; INTRAVENOUS at 16:05

## 2023-04-27 NOTE — CASE MANAGEMENT
Case Management Progress Note    Patient name Christine Becerra  Location [facility] 203/-90 MRN [id number]  : 1975 Date 2023       LOS (days): 1  Geometric Mean LOS (GMLOS) (days):   Days to GMLOS:        OBJECTIVE:        Current admission status: Inpatient  Preferred Pharmacy:   [address] [place] [id number]  Phone: [phone] Fax: [phone]    Primary Care Provider: No primary care provider on file  Primary Insurance: [address] [place] [address]  Secondary Insurance:     PROGRESS NOTE:    Per rounding with SLIM, patient is going for a spinal tap today and is anticipated to be discharged in 24 hours  No CM needs anticipated at this time  CM department will continue to follow patient through discharge

## 2023-04-27 NOTE — PLAN OF CARE
Problem: PAIN - ADULT  Goal: Verbalizes/displays adequate comfort level or baseline comfort level  Description: Interventions:  - Encourage patient to monitor pain and request assistance  - Assess pain using appropriate pain scale  - Administer analgesics based on type and severity of pain and evaluate response  - Implement non-pharmacological measures as appropriate and evaluate response  - Consider cultural and social influences on pain and pain management  - Notify physician/advanced practitioner if interventions unsuccessful or patient reports new pain  Outcome: Progressing     Problem: INFECTION - ADULT  Goal: Absence or prevention of progression during hospitalization  Description: INTERVENTIONS:  - Assess and monitor for signs and symptoms of infection  - Monitor lab/diagnostic results  - Monitor all insertion sites, i e  indwelling lines, tubes, and drains  - Monitor endotracheal if appropriate and nasal secretions for changes in amount and color  - [place] appropriate cooling/warming therapies per order  - Administer medications as ordered  - Instruct and encourage patient and family to use good hand hygiene technique  - Identify and instruct in appropriate isolation precautions for identified infection/condition  Outcome: Progressing  Goal: Absence of fever/infection during neutropenic period  Description: INTERVENTIONS:  - Monitor WBC    Outcome: Progressing     Problem: SAFETY ADULT  Goal: Patient will remain free of falls  Description: INTERVENTIONS:  - Educate patient/family on patient safety including physical limitations  - Instruct patient to call for assistance with activity   - Consult OT/PT to assist with strengthening/mobility   - Keep Call bell within reach  - Keep bed low and locked with side rails adjusted as appropriate  - Keep care items and personal belongings within reach  - Initiate and maintain comfort rounds  - Make Fall Risk Sign visible to staff  - Offer Toileting every 2 Hours, in advance of need  - Initiate/Maintain alarm  - Obtain necessary fall risk management equipment:   - Apply yellow socks and bracelet for high fall risk patients  - Consider moving patient to room near nurses station  Outcome: Progressing  Goal: Maintain or return to baseline ADL function  Description: INTERVENTIONS:  -  Assess patient's ability to carry out ADLs; assess patient's baseline for ADL function and identify physical deficits which impact ability to perform ADLs (bathing, care of mouth/teeth, toileting, grooming, dressing, etc )  - Assess/evaluate cause of self-care deficits   - Assess range of motion  - Assess patient's mobility; develop plan if impaired  - Assess patient's need for assistive devices and provide as appropriate  - Encourage maximum independence but intervene and supervise when necessary  - Involve family in performance of ADLs  - Assess for home care needs following discharge   - Consider OT consult to assist with ADL evaluation and planning for discharge  - Provide patient education as appropriate  Outcome: Progressing  Goal: Maintains/Returns to pre admission functional level  Description: INTERVENTIONS:  - Perform BMAT or MOVE assessment daily    - Set and communicate daily mobility goal to care team and patient/family/caregiver  - Collaborate with rehabilitation services on mobility goals if consulted  - Perform Range of Motion 2 times a day  - Reposition patient every 2 hours    - Dangle patient 2 times a day  - Stand patient 2 times a day  - Ambulate patient 2 times a day  - Out of bed to chair 2 times a day   - Out of bed for meals 2 times a day  - Out of bed for toileting  - Record patient progress and toleration of activity level   Outcome: Progressing     Problem: DISCHARGE PLANNING  Goal: Discharge to home or other facility with appropriate resources  Description: INTERVENTIONS:  - Identify barriers to discharge w/patient and caregiver  - Arrange for needed discharge resources and transportation as appropriate  - Identify discharge learning needs (meds, wound care, etc )  - Arrange for interpretive services to assist at discharge as needed  - Refer to Case Management Department for coordinating discharge planning if the patient needs post-hospital services based on physician/advanced practitioner order or complex needs related to functional status, cognitive ability, or social support system  Outcome: Progressing     Problem: Knowledge Deficit  Goal: Patient/family/caregiver demonstrates understanding of disease process, treatment plan, medications, and discharge instructions  Description: Complete learning assessment and assess knowledge base  Interventions:  - Provide teaching at level of understanding  - Provide teaching via preferred learning methods  Outcome: Progressing     Problem: Nutrition/Hydration-ADULT  Goal: Nutrient/Hydration intake appropriate for improving, restoring or maintaining nutritional needs  Description: Monitor and assess patient's nutrition/hydration status for malnutrition  Collaborate with interdisciplinary team and initiate plan and interventions as ordered  Monitor patient's weight and dietary intake as ordered or per policy  Utilize nutrition screening tool and intervene as necessary  Determine patient's food preferences and provide high-protein, high-caloric foods as appropriate       INTERVENTIONS:  - Monitor oral intake, urinary output, labs, and treatment plans  - Assess nutrition and hydration status and recommend course of action  - Evaluate amount of meals eaten  - Assist patient with eating if necessary   - Allow adequate time for meals  - Recommend/ encourage appropriate diets, oral nutritional supplements, and vitamin/mineral supplements  - Order, calculate, and assess calorie counts as needed  - Recommend, monitor, and adjust tube feedings and TPN/PPN based on assessed needs  - Assess need for intravenous fluids  - Provide specific nutrition/hydration education as appropriate  - Include patient/family/caregiver in decisions related to nutrition  Outcome: Progressing

## 2023-04-27 NOTE — PLAN OF CARE
Problem: PAIN - ADULT  Goal: Verbalizes/displays adequate comfort level or baseline comfort level  Description: Interventions:  - Encourage patient to monitor pain and request assistance  - Assess pain using appropriate pain scale  - Administer analgesics based on type and severity of pain and evaluate response  - Implement non-pharmacological measures as appropriate and evaluate response  - Consider cultural and social influences on pain and pain management  - Notify physician/advanced practitioner if interventions unsuccessful or patient reports new pain  Outcome: Progressing     Problem: INFECTION - ADULT  Goal: Absence or prevention of progression during hospitalization  Description: INTERVENTIONS:  - Assess and monitor for signs and symptoms of infection  - Monitor lab/diagnostic results  - Monitor all insertion sites, i e  indwelling lines, tubes, and drains  - Monitor endotracheal if appropriate and nasal secretions for changes in amount and color  - [place] appropriate cooling/warming therapies per order  - Administer medications as ordered  - Instruct and encourage patient and family to use good hand hygiene technique  - Identify and instruct in appropriate isolation precautions for identified infection/condition  Outcome: Progressing  Goal: Absence of fever/infection during neutropenic period  Description: INTERVENTIONS:  - Monitor WBC    Outcome: Progressing     Problem: SAFETY ADULT  Goal: Patient will remain free of falls  Description: INTERVENTIONS:  - Educate patient/family on patient safety including physical limitations  - Instruct patient to call for assistance with activity   - Consult OT/PT to assist with strengthening/mobility   - Keep Call bell within reach  - Keep bed low and locked with side rails adjusted as appropriate  - Keep care items and personal belongings within reach  - Initiate and maintain comfort rounds  - Make Fall Risk Sign visible to staff  - Offer Toileting every  Hours, in advance of need  - Initiate/Maintain alarm  - Obtain necessary fall risk management equipment:   - Apply yellow socks and bracelet for high fall risk patients  - Consider moving patient to room near nurses station  Outcome: Progressing  Goal: Maintain or return to baseline ADL function  Description: INTERVENTIONS:  -  Assess patient's ability to carry out ADLs; assess patient's baseline for ADL function and identify physical deficits which impact ability to perform ADLs (bathing, care of mouth/teeth, toileting, grooming, dressing, etc )  - Assess/evaluate cause of self-care deficits   - Assess range of motion  - Assess patient's mobility; develop plan if impaired  - Assess patient's need for assistive devices and provide as appropriate  - Encourage maximum independence but intervene and supervise when necessary  - Involve family in performance of ADLs  - Assess for home care needs following discharge   - Consider OT consult to assist with ADL evaluation and planning for discharge  - Provide patient education as appropriate  Outcome: Progressing  Goal: Maintains/Returns to pre admission functional level  Description: INTERVENTIONS:  - Perform BMAT or MOVE assessment daily    - Set and communicate daily mobility goal to care team and patient/family/caregiver  - Collaborate with rehabilitation services on mobility goals if consulted  - Perform Range of Motion  times a day  - Reposition patient every  hours    - Dangle patient  times a day  - Stand patient  times a day  - Ambulate patient  times a day  - Out of bed to chair  times a day   - Out of bed for meals times a day  - Out of bed for toileting  - Record patient progress and toleration of activity level   Outcome: Progressing     Problem: DISCHARGE PLANNING  Goal: Discharge to home or other facility with appropriate resources  Description: INTERVENTIONS:  - Identify barriers to discharge w/patient and caregiver  - Arrange for needed discharge resources and transportation as appropriate  - Identify discharge learning needs (meds, wound care, etc )  - Arrange for interpretive services to assist at discharge as needed  - Refer to Case Management Department for coordinating discharge planning if the patient needs post-hospital services based on physician/advanced practitioner order or complex needs related to functional status, cognitive ability, or social support system  Outcome: Progressing     Problem: Knowledge Deficit  Goal: Patient/family/caregiver demonstrates understanding of disease process, treatment plan, medications, and discharge instructions  Description: Complete learning assessment and assess knowledge base  Interventions:  - Provide teaching at level of understanding  - Provide teaching via preferred learning methods  Outcome: Progressing     Problem: Nutrition/Hydration-ADULT  Goal: Nutrient/Hydration intake appropriate for improving, restoring or maintaining nutritional needs  Description: Monitor and assess patient's nutrition/hydration status for malnutrition  Collaborate with interdisciplinary team and initiate plan and interventions as ordered  Monitor patient's weight and dietary intake as ordered or per policy  Utilize nutrition screening tool and intervene as necessary  Determine patient's food preferences and provide high-protein, high-caloric foods as appropriate       INTERVENTIONS:  - Monitor oral intake, urinary output, labs, and treatment plans  - Assess nutrition and hydration status and recommend course of action  - Evaluate amount of meals eaten  - Assist patient with eating if necessary   - Allow adequate time for meals  - Recommend/ encourage appropriate diets, oral nutritional supplements, and vitamin/mineral supplements  - Order, calculate, and assess calorie counts as needed  - Recommend, monitor, and adjust tube feedings and TPN/PPN based on assessed needs  - Assess need for intravenous fluids  - Provide specific nutrition/hydration education as appropriate  - Include patient/family/caregiver in decisions related to nutrition  Outcome: Progressing

## 2023-04-27 NOTE — DISCHARGE SUMMARY
[facility]  Discharge- 113 [facility] 1975, 49 y o  male MRN: [id number]  Unit/Bed#: -01 Encounter: [id number]  Primary Care Provider: No primary care provider on file  Date and time admitted to hospital: 4/25/2023 10:02 AM    * Stroke-like symptoms versus demyelination  Assessment & Plan  · Presented with paresthesia that has since resolved  · We will need to hold Plavix and ASA after today due to possible pending spinal tap  · Awaiting hemoglobin A1c  · Lipid panel showed 230 total cholesterol, triglyceride 134, HDL 32,   · MRI of brain subcentimeter lesion within the posterior right centrum semiovale demonstrating central cystic change and peripheral rim of hyperintense T2/FLAIR signal with mild diffusion restriction which raises concern for demyelinating disease   · CT of brain showed age indeterminate hypodensities  · CT of head and neck showed no stenosis/hemorrhage  · Neurology consulted- will be conducting a spinal tap some point this week   · Started on aspirin and Plavix on admission   · Discontinuing Plavix at this time since patient will need a lumbar puncture, planned for today? · Sed rate EDEN Lyme titer ACE level   Patient advised to follow-up with outpatient neurology in 1 week for further evaluation  HTN (hypertension) with goal to be determined  Assessment & Plan  Blood pressures were uncontrolled  Started amlodipine and metoprolol  Improving    Anxiety  Assessment & Plan  Patient has numerous major stressful events occurring in his life simultaneously in the past few months  We will give Atarax as needed for anxiety    Tobacco abuse  Assessment & Plan  Patient recently started smoking approximately 4 months ago due to recent stresses  He reports smoking up to a pack and a half a day      Smoking cessation education  We will provide nicotine patch       Discharging Physician / Practitioner: David Cash  PCP: No primary care provider on file   Admission Date:   Admission Orders (From admission, onward)     Ordered        04/26/23 1403  Inpatient Admission  Once            04/25/23 1050  Place in Observation  Once                      Discharge Date: 04/27/23    Medical Problems        Consultations During Hospital Stay:  · IP CONSULT TO NEUROLOGY  · IP CONSULT TO NUTRITION SERVICES    Procedures Performed:   CT stroke alert brain    Result Date: 4/25/2023  Narrative: CT BRAIN - STROKE ALERT PROTOCOL INDICATION:   Stroke Alert  Headache  Paresthesias in the right-side of the lips  Hypertension  COMPARISON:  ED provider note 4/25/2023 TECHNIQUE:  CT examination of the brain was performed  In addition to axial images, coronal reformatted images were created and submitted for interpretation  Radiation dose length product (DLP) for this visit:  873 mGy-cm   This examination, like all CT scans performed in the [facility], was performed utilizing techniques to minimize radiation dose exposure, including the use of iterative reconstruction and automated exposure control  IMAGE QUALITY:  Diagnostic  FINDINGS:  PARENCHYMA:  A few subpleural cortical hypodensities noted bilaterally in the parietal lobes, series 2 image 32, age indeterminate  More chronic appearing hypodensity in the right frontal centrum semiovale series 2, image 9, possibly a chronic lacunar infarction  Normal intracranial vasculature  VENTRICLES AND EXTRA-AXIAL SPACES:  Normal for the patient's age  VISUALIZED ORBITS: Normal visualized orbits  PARANASAL SINUSES: Normal visualized paranasal sinuses  CALVARIUM AND EXTRACRANIAL SOFT TISSUES:   Normal      Impression: 1  No acute intracranial hemorrhage  2   A few scattered age-indeterminate hypodensities in the parietal lobes bilaterally  Age-indeterminate infarction is in the differential diagnosis  Precocious changes of microangiopathy could be considered if there are cerebrovascular risk factors    Other postinfectious, postinflammatory or demyelinating processes including Lyme disease or multiple sclerosis are in the differential diagnosis  Contrast enhanced MRI of the brain may be of additional use for further characterization  Workstation performed: [id number]     MRI brain MS wo and w contrast    Result Date: 4/26/2023  Narrative: MRI BRAIN WITH AND WITHOUT CONTRAST INDICATION:  stroke, right facial numbness, right thumb numbness, abnormal ct scan, ?lyme, ?MS  Demyelinating disease  COMPARISON: CTA head and neck 4/25/2023 TECHNIQUE:  Multiplanar, multisequence imaging of the brain was performed before and after gadolinium administration  IV Contrast:  9 mL of Gadobutrol injection (SINGLE-DOSE) IMAGE QUALITY:  Diagnostic  FINDINGS: BRAIN PARENCHYMA: Hyperintense T2/FLAIR foci are noted within the periventricular and subcortical white matter which are nonspecific and can be seen with vasculitis, migrainous angiopathy, demyelinating disease or early microangiopathic changes  There is  a 0 8 cm lesion within the posterior right centrum semiovale demonstrating central cystic change and peripheral rim of hyperintense T2/FLAIR signal with mild diffusion restriction  A 0 6 cm hyperintense T2/FLAIR lesion is noted within the central right cerebellum (series 9: 5)  There is a 0 5 cm focus of restricted diffusion within the left thalamus  Few subcentimeter foci of mild restricted diffusion are noted within the left centrum semiovale  Low-lying cerebellar tonsils without meeting criteria for Chiari I malformation  Postcontrast imaging of the brain demonstrates no abnormal enhancement  There is no discrete mass, mass effect or midline shift  There is no intracranial hemorrhage  VENTRICLES:  Normal for the patient's age  SELLA AND PITUITARY GLAND:  Normal  ORBITS:  Normal  PARANASAL SINUSES: Patchy ethmoidal mucosal thickening  Mild right mastoid fluid  The left mastoid air cells are clear   VASCULATURE:  Evaluation of the major intracranial vasculature demonstrates appropriate flow voids  CALVARIUM AND SKULL BASE:  Normal  EXTRACRANIAL SOFT TISSUES: Small bilateral submandibular, upper jugular chain and posterior triangle lymph nodes which are below size criteria for pathologic adenopathy  Impression: 1  Periventricular and subcortical white matter lesions which are nonspecific and can be seen with vasculitis, migrainous angiopathy, demyelinating disease or early microangiopathic changes  There is a subcentimeter lesion within the posterior right centrum semiovale demonstrating central cystic change and peripheral rim of hyperintense T2/FLAIR signal with mild diffusion restriction which raises concern for demyelinating disease  A subcentimeter lesion is noted within the central right cerebellum  There is no pathologic area of intra-axial enhancement to suggest acute demyelination  2  Subcentimeter foci of mild restricted diffusion within the left thalamus and centrum semiovale which can be seen with ischemic changes or demyelination  3  Low-lying cerebellar tonsils without meeting criteria for Chiari I malformation  The study was marked in [facility] for immediate notification  Workstation performed: [id number]     CTA stroke alert (head/neck)    Result Date: 4/25/2023  Narrative: CTA NECK AND BRAIN WITH CONTRAST INDICATION: Stroke Alert headaches  Hypertension  Lip/facial paresthesias  COMPARISON:   None  TECHNIQUE:   Post contrast imaging was performed after administration of iodinated contrast through the neck and brain  Post contrast axial 0 625 mm images timed to opacify the arterial system  3D rendering was performed on an independent workstation  MIP reconstructions performed  Coronal reconstructions were performed of the noncontrast portion of the brain  Radiation dose length product (DLP) for this visit:  674 mGy-cm     This examination, like all CT scans performed in the [facility], was performed utilizing techniques to minimize radiation dose exposure, including the use of iterative reconstruction and automated exposure control  IV Contrast:  100 mL of iohexol (OMNIPAQUE)  IMAGE QUALITY:   Diagnostic FINDINGS: CERVICAL VASCULATURE AORTIC ARCH AND GREAT VESSELS:  Normal aortic arch and great vessel origins  Normal visualized subclavian vessels  RIGHT VERTEBRAL ARTERY CERVICAL SEGMENT:  Normal origin  The vessel is normal in caliber throughout the neck  The right vertebral artery is congenitally dominant  LEFT VERTEBRAL ARTERY CERVICAL SEGMENT:  Normal origin  The vessel is normal in caliber throughout the neck  RIGHT EXTRACRANIAL CAROTID SEGMENT:  Mild atherosclerotic disease of the distal common carotid artery and proximal cervical internal carotid artery without significant stenosis compared to the more distal ICA  LEFT EXTRACRANIAL CAROTID SEGMENT:  Mild atherosclerotic disease of the distal common carotid artery and proximal cervical internal carotid artery without significant stenosis compared to the more distal ICA  NASCET criteria was used to determine the degree of internal carotid artery diameter stenosis  INTRACRANIAL VASCULATURE INTERNAL CAROTID ARTERIES:  Mild atherosclerotic vascular calcifications noted in the cavernous segments of both internal carotid arteries     Normal ophthalmic artery origins  Normal ICA terminus  ANTERIOR CIRCULATION:  Symmetric A1 segments and anterior cerebral arteries with normal enhancement  Normal anterior communicating artery  MIDDLE CEREBRAL ARTERY CIRCULATION:  M1 segment and middle cerebral artery branches demonstrate normal enhancement bilaterally  DISTAL VERTEBRAL ARTERIES:  Normal distal vertebral arteries  Posterior inferior cerebellar artery origins are normal  Normal vertebral basilar junction  BASILAR ARTERY:  Basilar artery is normal in caliber  Normal superior cerebellar arteries   POSTERIOR CEREBRAL ARTERIES: Both posterior cerebral arteries arises from the basilar tip  Both arteries demonstrate normal enhancement  Normal posterior communicating arteries  VENOUS STRUCTURES:  Normal  NON VASCULAR ANATOMY BONY STRUCTURES:  No acute osseous abnormality  Mild reversal the cervical lordosis centered at the C4 level  Localized ossification of posterior longitudinal ligament noted at the 3 C4 acentrically on the left  Mild scattered spondylotic changes elsewhere  SOFT TISSUES OF THE NECK: 3 mm hypodense nodule right lobe of the thyroid gland, series 2, image 189  Incidental discovery of one or more thyroid nodule(s) measuring less than 1 5 cm and without suspicious features is noted in this patient who is above 28years old; according to guidelines published in the February 2015 white paper on incidental thyroid nodules in the Journal of the [employer] of Radiology [facility]), no further evaluation is recommended  THORACIC INLET: Visualized lung apices are clear  Impression: 1  No hemodynamically significant stenosis in the major arteries of the neck  2   No intracranial aneurysm or major intracranial arterial stenosis  3   No acute intracranial hemorrhage  I personally provided preliminary results of this study with Andi Gavin and iAda Ernst on 4/25/2023 at 10:42 AM   Workstation performed: [id number]     Echo complete w/ contrast if indicated    Result Date: 4/26/2023  Narrative: •  Left Ventricle: Left ventricular cavity size is normal  Wall thickness is severely increased  There is severe concentric hypertrophy  The left ventricular ejection fraction is 55%  Systolic function is normal  Wall motion is normal  Diastolic function is mildly abnormal, consistent with grade I (abnormal) relaxation  •  Aortic Valve: The aortic valve is trileaflet  The leaflets are mildly thickened  The leaflets are moderately calcified  The leaflets exhibit normal mobility  •  Mitral Valve: There is annular calcification     ·   ·     Significant Findings / "Test Results:   · none    Incidental Findings:   · See above     Test Results Pending at Discharge (will require follow up):   · none     Outpatient Tests Requested:  · Spinal tap    Complications:  none    History reviewed  No pertinent past medical history  Reason for Admission: Facial Numbness (Pt states he has numbness in the right side of his lips  Pt states he also had a headache last night that has subsided  Per EMS the pt's BP was elevated  No medical hx )       Hospital Course:     Nichole Mcdonald is a 49 y o  male patient with past medical history of none who originally presented to the hospital on 4/25/2023 due to Facial Numbness (Pt states he has numbness in the right side of his lips  Pt states he also had a headache last night that has subsided  Per EMS the pt's BP was elevated  No medical hx ) patient came in with facial numbness and was concern for CVA had elevated blood pressure on admission stroke work-up was completed with there is also some concern for some autoimmune/MS was evaluated by neurology we will continue to need close outpatient follow-up with neurology and will be getting a spinal tap later this week    Please see above list of diagnoses and related plan for additional information  Condition at Discharge: stable     Discharge Day Visit / Exam:     Subjective: Patient is lying in bed comfortably reports that all numbness has resolved offers no complaints is eager for discharge understands follow-up  Vitals: Blood Pressure: 154/84 (04/27/23 1100)  Pulse: 71 (04/27/23 1100)  Temperature: 97 5 °F (36 4 °C) (04/27/23 1100)  Temp Source: Oral (04/27/23 1100)  Respirations: 16 (04/27/23 1100)  Height: 5' 9\" (175 3 cm) (04/26/23 1345)  Weight - Scale: 95 3 kg (210 lb) (04/26/23 1128)  SpO2: 92 % (04/27/23 1100)  Exam:   Physical Exam  Constitutional:       General: He is not in acute distress  Appearance: He is ill-appearing  Cardiovascular:      Rate and Rhythm: Normal rate   " Pulmonary:      Effort: No respiratory distress  Abdominal:      Palpations: Abdomen is soft  Skin:     General: Skin is warm  Neurological:      Mental Status: He is alert  Mental status is at baseline  Psychiatric:         Mood and Affect: Mood normal          Discussion with Family: na    Discharge instructions/Information to patient and family:   See after visit summary for information provided to patient and family  Provisions for Follow-Up Care:  See after visit summary for information related to follow-up care and any pertinent home health orders  Disposition:     Home    Planned Readmission: no     Discharge Statement:  I spent 45 minutes discharging the patient  This time was spent on the day of discharge  I had direct contact with the patient on the day of discharge  Greater than 50% of the total time was spent examining patient, answering all patient questions, arranging and discussing plan of care with patient as well as directly providing post-discharge instructions  Additional time then spent on discharge activities  Discharge Medications:  See after visit summary for reconciled discharge medications provided to patient and family        ** Please Note: This note has been constructed using a voice recognition system **

## 2023-04-27 NOTE — ASSESSMENT & PLAN NOTE
· Presented with paresthesia that has since resolved  · We will need to hold Plavix and ASA after today due to possible pending spinal tap  · Awaiting hemoglobin A1c  · Lipid panel showed 230 total cholesterol, triglyceride 134, HDL 32,   · MRI of brain subcentimeter lesion within the posterior right centrum semiovale demonstrating central cystic change and peripheral rim of hyperintense T2/FLAIR signal with mild diffusion restriction which raises concern for demyelinating disease   · CT of brain showed age indeterminate hypodensities  · CT of head and neck showed no stenosis/hemorrhage  · Neurology consulted- will be conducting a spinal tap some point this week   · Started on aspirin and Plavix on admission   · Discontinuing Plavix at this time since patient will need a lumbar puncture, planned for today? · Sed rate EDEN Lyme titer ACE level   Patient advised to follow-up with outpatient neurology in 1 week for further evaluation

## 2023-04-28 LAB
ACE SERPL-CCNC: 72 U/L (ref 14–82)
DSDNA AB SER-ACNC: <1 IU/ML (ref 0–9)
ENA SS-A AB SER-ACNC: <0.2 AI (ref 0–0.9)
ENA SS-B AB SER-ACNC: <0.2 AI (ref 0–0.9)

## 2023-04-29 LAB
EST. AVERAGE GLUCOSE BLD GHB EST-MCNC: 137 MG/DL
HBA1C MFR BLD: 6.4 %

## 2023-05-01 LAB
C-ANCA TITR SER IF: NORMAL TITER
MYELOPEROXIDASE AB SER IA-ACNC: <0.2 UNITS (ref 0–0.9)
P-ANCA ATYPICAL TITR SER IF: NORMAL TITER
P-ANCA TITR SER IF: NORMAL TITER
PROTEINASE3 AB SER IA-ACNC: <0.2 UNITS (ref 0–0.9)

## 2023-05-05 NOTE — UTILIZATION REVIEW
NOTIFICATION OF ADMISSION DISCHARGE   This is a Notification of Discharge from [address] [place] [address]  Please be advised that this patient has been discharge from our facility  Below you will find the admission and discharge date and time including the patients disposition  UTILIZATION REVIEW CONTACT:  Heather Felder  Utilization   Network Utilization Review Department  Phone: [phone] x carefully listen to the prompts  All voicemails are confidential   Email: [email or web address]     ADMISSION INFORMATION  PRESENTATION DATE: 4/25/2023 10:02 AM  OBERVATION ADMISSION DATE: 4/25/23  INPATIENT ADMISSION DATE: 4/26/23  2:03 PM   DISCHARGE DATE: 4/27/2023  6:32 PM   DISPOSITION:Home/Self Care    IMPORTANT INFORMATION:  Send all requests for admission clinical reviews, approved or denied determinations and any other requests to dedicated fax number below belonging to the campus where the patient is receiving treatment   List of dedicated fax numbers:  [address] DENIALS (Administrative/Medical Necessity) [phone]   [address] (Maternity/NICU/Pediatrics) [phone]   Richard Stubbs [phone]   [facility] [phone]   Pablo Alu [phone]   [facility] [address] [place] [address],[address] [phone]   [facility]  [phone]   [address] [facility] [address] [facility]  [facility] [address] [facility] [phone]

## 2023-05-10 ENCOUNTER — TELEPHONE (OUTPATIENT)
Dept: NEUROLOGY | Facility: CLINIC | Age: 48
End: 2023-05-10

## 2023-05-10 NOTE — TELEPHONE ENCOUNTER
Hello Good Morning,     Can you please clarify HFU Instructions? Should patient be seen by a Neurovascular Ap/Attending? This patient will need to be seen on an outpatient basis hopefully within a month or 2 in any of our neurology offices here or in the [facility] at the [place] or [facility]  Much remains to be determined given that this gentlemen's may still need a spinal tap  HFU/AMANDA/ STROKE [address] [place] [address] 04/27/2023  Good morning,     This patient sounds a bit more complicated and it seems that there is pending workup, so I would recommend that he follow up with either general or neurovascular attending   Dr Jairo Watkins advised follow up within the next 1 week, but if this is not possible, ideally within the next 3-4 weeks  Thanks!    Shelley       Thank you for your time,    Deanne Murray

## 2023-05-11 NOTE — TELEPHONE ENCOUNTER
1ST ATTEMPT,     Called pt , I believe pt hung the phone up, I then called again just make sure we didn't get disconnected but no answer and VM full  Just an ДМИТРИЙI, Pt has Jeff 51 and lives in [facility]      Thank you,     Zaida Cortez

## 2023-05-18 NOTE — TELEPHONE ENCOUNTER
2ND ATTEMPT ,     SAME AS THE FIRST ATTEMPT,     Called pt , I believe pt hung the phone up, I then called again just make sure we didn't get disconnected but no answer and VM full      Just an ДМИТРИЙI, Pt has Jeff 51 and lives in [place] [address]    Thank you,     Rufus Gleason
